# Patient Record
Sex: MALE | Race: WHITE | Employment: OTHER | URBAN - METROPOLITAN AREA
[De-identification: names, ages, dates, MRNs, and addresses within clinical notes are randomized per-mention and may not be internally consistent; named-entity substitution may affect disease eponyms.]

---

## 2017-07-17 ENCOUNTER — HOSPITAL ENCOUNTER (OUTPATIENT)
Facility: CLINIC | Age: 77
Discharge: HOME | End: 2017-07-17
Attending: OPHTHALMOLOGY | Admitting: OPHTHALMOLOGY
Payer: MEDICARE

## 2017-07-17 ENCOUNTER — ANESTHESIA EVENT (OUTPATIENT)
Dept: SURGERY | Facility: CLINIC | Age: 77
End: 2017-07-17
Payer: MEDICARE

## 2017-07-17 ENCOUNTER — ANESTHESIA (OUTPATIENT)
Dept: SURGERY | Facility: CLINIC | Age: 77
End: 2017-07-17
Payer: MEDICARE

## 2017-07-17 VITALS
SYSTOLIC BLOOD PRESSURE: 112 MMHG | WEIGHT: 218 LBS | RESPIRATION RATE: 18 BRPM | TEMPERATURE: 96.4 F | HEIGHT: 67 IN | OXYGEN SATURATION: 96 % | DIASTOLIC BLOOD PRESSURE: 65 MMHG | HEART RATE: 62 BPM | BODY MASS INDEX: 34.21 KG/M2

## 2017-07-17 DIAGNOSIS — H02.831 DERMATOCHALASIS OF BOTH UPPER EYELIDS: Primary | ICD-10-CM

## 2017-07-17 DIAGNOSIS — H02.834 DERMATOCHALASIS OF BOTH UPPER EYELIDS: Primary | ICD-10-CM

## 2017-07-17 PROCEDURE — 25000125 ZZHC RX 250: Performed by: OPHTHALMOLOGY

## 2017-07-17 PROCEDURE — 36000101 ZZH EYE SURGERY LEVEL 3 1ST 30 MIN: Performed by: OPHTHALMOLOGY

## 2017-07-17 PROCEDURE — 25000125 ZZHC RX 250

## 2017-07-17 PROCEDURE — 25000125 ZZHC RX 250: Performed by: ANESTHESIOLOGY

## 2017-07-17 PROCEDURE — 37000008 ZZH ANESTHESIA TECHNICAL FEE, 1ST 30 MIN: Performed by: OPHTHALMOLOGY

## 2017-07-17 PROCEDURE — 71000028 ZZH EYE RECOVERY PHASE 2 EACH 15 MINS: Performed by: OPHTHALMOLOGY

## 2017-07-17 PROCEDURE — 36000102 ZZH EYE SURGERY LEVEL 3 EA 15 ADDTL MIN: Performed by: OPHTHALMOLOGY

## 2017-07-17 PROCEDURE — 37000009 ZZH ANESTHESIA TECHNICAL FEE, EACH ADDTL 15 MIN: Performed by: OPHTHALMOLOGY

## 2017-07-17 PROCEDURE — 27210794 ZZH OR GENERAL SUPPLY STERILE: Performed by: OPHTHALMOLOGY

## 2017-07-17 PROCEDURE — 25000125 ZZHC RX 250: Performed by: NURSE ANESTHETIST, CERTIFIED REGISTERED

## 2017-07-17 PROCEDURE — 25000128 H RX IP 250 OP 636: Performed by: ANESTHESIOLOGY

## 2017-07-17 PROCEDURE — 40000170 ZZH STATISTIC PRE-PROCEDURE ASSESSMENT II: Performed by: OPHTHALMOLOGY

## 2017-07-17 PROCEDURE — 25000128 H RX IP 250 OP 636: Performed by: NURSE ANESTHETIST, CERTIFIED REGISTERED

## 2017-07-17 RX ORDER — PROPOFOL 10 MG/ML
INJECTION, EMULSION INTRAVENOUS PRN
Status: DISCONTINUED | OUTPATIENT
Start: 2017-07-17 | End: 2017-07-17

## 2017-07-17 RX ORDER — ERYTHROMYCIN 5 MG/G
OINTMENT OPHTHALMIC PRN
Status: DISCONTINUED | OUTPATIENT
Start: 2017-07-17 | End: 2017-07-17 | Stop reason: HOSPADM

## 2017-07-17 RX ORDER — FENTANYL CITRATE 50 UG/ML
INJECTION, SOLUTION INTRAMUSCULAR; INTRAVENOUS PRN
Status: DISCONTINUED | OUTPATIENT
Start: 2017-07-17 | End: 2017-07-17

## 2017-07-17 RX ORDER — HYDROCODONE BITARTRATE AND ACETAMINOPHEN 5; 325 MG/1; MG/1
1 TABLET ORAL EVERY 6 HOURS PRN
Qty: 15 TABLET | Refills: 0 | Status: SHIPPED | OUTPATIENT
Start: 2017-07-17 | End: 2017-09-18

## 2017-07-17 RX ORDER — ONDANSETRON 2 MG/ML
INJECTION INTRAMUSCULAR; INTRAVENOUS PRN
Status: DISCONTINUED | OUTPATIENT
Start: 2017-07-17 | End: 2017-07-17

## 2017-07-17 RX ORDER — TETRACAINE HYDROCHLORIDE 5 MG/ML
SOLUTION OPHTHALMIC PRN
Status: DISCONTINUED | OUTPATIENT
Start: 2017-07-17 | End: 2017-07-17 | Stop reason: HOSPADM

## 2017-07-17 RX ORDER — SODIUM CHLORIDE, SODIUM LACTATE, POTASSIUM CHLORIDE, CALCIUM CHLORIDE 600; 310; 30; 20 MG/100ML; MG/100ML; MG/100ML; MG/100ML
INJECTION, SOLUTION INTRAVENOUS CONTINUOUS
Status: DISCONTINUED | OUTPATIENT
Start: 2017-07-17 | End: 2017-07-17 | Stop reason: HOSPADM

## 2017-07-17 RX ORDER — ERYTHROMYCIN 5 MG/G
OINTMENT OPHTHALMIC
Qty: 1 G | Refills: 1 | Status: SHIPPED | OUTPATIENT
Start: 2017-07-17

## 2017-07-17 RX ADMIN — FENTANYL CITRATE 25 MCG: 50 INJECTION, SOLUTION INTRAMUSCULAR; INTRAVENOUS at 13:56

## 2017-07-17 RX ADMIN — SODIUM CHLORIDE, POTASSIUM CHLORIDE, SODIUM LACTATE AND CALCIUM CHLORIDE: 600; 310; 30; 20 INJECTION, SOLUTION INTRAVENOUS at 12:44

## 2017-07-17 RX ADMIN — DEXMEDETOMIDINE HYDROCHLORIDE 8 MCG: 100 INJECTION, SOLUTION INTRAVENOUS at 13:38

## 2017-07-17 RX ADMIN — PROPOFOL 10 MG: 10 INJECTION, EMULSION INTRAVENOUS at 13:59

## 2017-07-17 RX ADMIN — MIDAZOLAM HYDROCHLORIDE 0.5 MG: 1 INJECTION, SOLUTION INTRAMUSCULAR; INTRAVENOUS at 13:38

## 2017-07-17 RX ADMIN — PROPOFOL 30 MG: 10 INJECTION, EMULSION INTRAVENOUS at 13:56

## 2017-07-17 RX ADMIN — DEXMEDETOMIDINE HYDROCHLORIDE 4 MCG: 100 INJECTION, SOLUTION INTRAVENOUS at 13:52

## 2017-07-17 RX ADMIN — FENTANYL CITRATE 25 MCG: 50 INJECTION, SOLUTION INTRAMUSCULAR; INTRAVENOUS at 13:39

## 2017-07-17 RX ADMIN — DEXMEDETOMIDINE HYDROCHLORIDE 8 MCG: 100 INJECTION, SOLUTION INTRAVENOUS at 13:44

## 2017-07-17 RX ADMIN — LIDOCAINE HYDROCHLORIDE 1 ML: 10 INJECTION, SOLUTION EPIDURAL; INFILTRATION; INTRACAUDAL; PERINEURAL at 12:44

## 2017-07-17 RX ADMIN — ONDANSETRON 4 MG: 2 INJECTION INTRAMUSCULAR; INTRAVENOUS at 13:45

## 2017-07-17 NOTE — DISCHARGE INSTRUCTIONS
POST-OPERATIVE CARE FOLLOWING EYELID SURGERY  DR. Bryon Chandler  [facility] EYE [facility]  [phone]  ICE COMPRESSES:  Immediately following surgery, you should begin to apply ice compresses.  A good method is to use a clean washcloth soaked in a bowl of ice water.  Wring it out and gently lay across your eyes.  Do this at least 6 times per day for 15 minutes, but not while sleeping.  Continue cold compresses for the first four days after surgery, or longer if swelling persists.    OINTMENT:  Apply ERYTHROMYCIN OINTMENT to the surgical area (along the sutures if present) 3 TIMES PER DAY FOR 1 WEEK.  If you get ointment in your eye, it will blur your vision slightly, but will not harm you eye.  If you run out of antibiotic ointment use Vaseline instead until the wounds are fully healed.    ACTIVITY:  You may resume regular activities as tolerated.  You may shower and wash your hair on the day after surgery, be careful to avoid getting shampoo in your eyes. Sleeping with your head elevated will help with swelling.    MEDICATION:  Some discomfort and tenderness may be noticed around the eye.  If the DrLauren has prescribed pain medication, please take it according to the directions on the bottle.  If you are not prescribed pain medication or you feel you do not need it, you may take Extra Strength Tylenol for mild pain.      WHAT TO EXPECT:  You should expect some slight oozing of blood from the incision site over the first two to three days after surgery.  Swelling and bruising will occur for one to two weeks or longer.  You may also experience itching and tearing during the first several weeks after surgery.  This is part of the normal healing process.    CALL THE DOCTOR S OFFICE WITH ANY QUESTIONS OR CONCERNS, [phone].  Please call if you suspect your wound has opened, you have a large / growing bruise (hematoma), vision loss not attributable to the ointment, or bleeding that does not seem to be  stopping.    [facility] Anesthesia [facility] Discharge  Instructions  Anesthesia (Eye Care Center)   Adult Discharge Instructions    For 24 hours after surgery    1. Get plenty of rest.  Make arrangements to have a responsible adult stay with you for at least 6 hours after you leave the hospital.  2. Do not drive or use heavy equipment for 24 hours.    3. Do not drink alcohol for 24 hours.  4. Do not sign legal documents or make important decisions for 24 hours.  5. Avoid strenuous or risky activities. You may feel lightheaded.  If so, sit for a few minutes before standing.  Have someone help you get up.   6. Conscious sedation patients may resume a regular diet..  7. Any questions of medical nature, call your physician.

## 2017-07-17 NOTE — ANESTHESIA CARE TRANSFER NOTE
Patient: Juan Higuera    Procedure(s):  BILATERAL UPPER LID BLEPHAROPLASTY - Wound Class: I-Clean    Diagnosis: PTOSIS  Diagnosis Additional Information: No value filed.    Anesthesia Type:   MAC     Note:  Airway :Room Air  Patient transferred to:PACU  Comments: VSS. Airway and IV patent. Patient comfortable. Report to RN. Stable care transfer.      Vitals: (Last set prior to Anesthesia Care Transfer)    CRNA VITALS  7/17/2017 1416 - 7/17/2017 1448      7/17/2017             Pulse: 62    SpO2: 94 %    Resp Rate (set): 10    EKG: Sinus bradycardia                Electronically Signed By: LESLIE Wood CRNA  July 17, 2017  2:48 PM

## 2017-07-17 NOTE — ANESTHESIA PREPROCEDURE EVALUATION
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)      History of shingles      Hydrocoele     bilat per ankur     Hypercholesterolemia      Hyperlipidaemia      Hypertension      Joint pain      Pituitary adenoma (H)      Plantar fasciitis      Shingles      Sleep apnea     uses cpap     Spinal stenosis      Spinal stenosis      Spinal stenosis      Vitreous hemorrhage (H)      Past Surgical History:   Procedure Laterality Date     APPENDECTOMY       COLONOSCOPY       COLONOSCOPY N/A 10/1/2015    Procedure: COLONOSCOPY;  Surgeon: Walter Arreola MD;  Location:  GI     GENITOURINARY SURGERY      VASECTOMY     GRAFT BONE FROM ILIAC CREST Right 1/4/2016    Procedure: GRAFT BONE FROM ILIAC CREST;  Surgeon: Fly Jauregui MD;  Location:  OR     HYDROCELECTOMY SCROTAL  7/30/2013    Procedure: HYDROCELECTOMY SCROTAL;  BILATERAL HYDROCELECTOMY;  Surgeon: Esdras Cintron MD;  Location:  SD     INCISION LIMBAL RELAXING, KERATOTOMY ARCUATE  9/16/2013    Procedure: INCISION LIMBAL RELAXING, KERATOTOMY ARCUATE;;  Surgeon: Bryon Chandler MD;  Location:  EC     INCISION LIMBAL RELAXING, KERATOTOMY ARCUATE  10/7/2013    Procedure: INCISION LIMBAL RELAXING, KERATOTOMY ARCUATE;;  Surgeon: Bryon Chandler MD;  Location:  EC     LAMINECTOMY, FUSION LUMBAR ONE LEVEL, COMBINED N/A 1/4/2016    Procedure: COMBINED LAMINECTOMY, FUSION LUMBAR ONE LEVEL;  Surgeon: Fly Jauregui MD;  Location:  OR     PHACOEMULSIFICATION CLEAR CORNEA WITH STANDARD INTRAOCULAR LENS IMPLANT  9/16/2013    Procedure: PHACOEMULSIFICATION CLEAR CORNEA WITH STANDARD INTRAOCULAR LENS  IMPLANT;  RIGHT EYE PHACOEMULSIFICATION CLEAR CORNEA WITH STANDARD INTRAOCULAR LENS IMPLANT AND LIMBAL RELAXING INCISION  ;  Surgeon: Bryon Chandler MD;  Location: [facility]     PHACOEMULSIFICATION CLEAR CORNEA WITH STANDARD INTRAOCULAR LENS IMPLANT  10/7/2013    Procedure: PHACOEMULSIFICATION CLEAR CORNEA WITH STANDARD INTRAOCULAR LENS IMPLANT;  LEFT EYE PHACOEMULSIFICATION CLEAR CORNEA WITH STANDARD INTRAOCULAR LENS IMPLANT AND LIMBAL RELAXING INCISION ;  Surgeon: Bryon Chandler MD;  Location: [facility]     Prior to Admission medications    Medication Sig Start Date End Date Taking? Authorizing Provider   VITAMIN D, CHOLECALCIFEROL, PO Take 5,000 Units by mouth daily    Yes Reported, Patient   Ascorbic Acid (VITAMIN C PO)    Yes Reported, Patient   levothyroxine (SYNTHROID, LEVOTHROID) 12.5 MCG TABS Take 25 mcg by mouth every morning (before breakfast)   Yes Reported, Patient   cabergoline (DOSTINEX) 0.5 MG tablet Take 0.25 mg by mouth twice a week   Yes Reported, Patient   labetalol (NORMODYNE) 200 MG tablet Take 1 tablet (200 mg) by mouth 2 times daily 9/22/16  Yes Abhay Busch MD   MINOXIDIL PO Take 5 mg by mouth daily (takes 0.5 x 10mg tablet)   Yes Reported, Patient   diphenhydrAMINE-acetaminophen (TYLENOL PM)  MG tablet Take 2 tablets by mouth At Bedtime   Yes Reported, Patient   amLODIPine (NORVASC) 5 MG tablet Take 1 tablet (5 mg) by mouth daily 9/14/15  Yes Abhay Busch MD   naproxen sodium (ALEVE) 220 MG tablet Take 440 mg by mouth daily    Yes Reported, Patient   lisinopril-hydrochlorothiazide (PRINZIDE,ZESTORETIC) 20-25 MG per tablet Take 1 tablet by mouth daily 1 tabs daily 11/18/14  Yes Camden Kerns MD   Acetaminophen (TYLENOL PO) Take 1,000 mg by mouth 2 times daily Morning and Noon   Yes Reported, Patient   aspirin 81 MG chewable tablet Take 81 mg by mouth daily.   Yes Reported, Patient   UNKNOWN TO PATIENT Apply topically daily as needed (Eczema) Cream: medication name  unknown    Reported, Patient     Current Facility-Administered Medications Ordered in Epic   Medication Dose Route Frequency Last Rate Last Dose     lidocaine 1 % 1 mL  1 mL Other Q1H PRN   1 mL at 07/17/17 1244     lactated ringers infusion   Intravenous Continuous 25 mL/hr at 07/17/17 1244       No current [facility]-ordered outpatient prescriptions on file.     Wt Readings from Last 1 Encounters:   07/17/17 98.9 kg (218 lb)     Temp Readings from Last 1 Encounters:   07/17/17 35.8  C (96.4  F) (Temporal)     BP Readings from Last 6 Encounters:   07/17/17 99/65   09/22/16 132/76   01/08/16 125/80   10/01/15 (!) 141/95   09/09/15 132/80   01/07/15 130/78     Pulse Readings from Last 4 Encounters:   07/17/17 62   09/22/16 76   09/09/15 68   01/07/15 68     Resp Readings from Last 1 Encounters:   07/17/17 18     SpO2 Readings from Last 1 Encounters:   07/17/17 100%     Recent Labs   Lab Test 07/05/16 01/04/16   0632  01/07/15   0822  11/25/14   1207   NA  142   --   140  142   POTASSIUM  4.3  4.0  4.7  4.6   CHLORIDE  104   --   103  103   CO2   --    --   30*  31*   ANIONGAP   --    --   11.7  12.6   GLC  89   --   107*  91   BUN  24   --   15  14   CR  1.26  1.09  1.06  1.03   LAMONT  9.4   --   9.7  9.9     Recent Labs   Lab Test 07/05/16 01/07/16   0730   01/17/12   1200   WBC  7.4   --    --   6.9   HGB  12.8*  10.0*   < >  14.3   PLT  248   --    --   238    < > = values in this interval not displayed.       Anesthesia Evaluation     . Pt has had prior anesthetic.     No history of anesthetic complications          ROS/MED HX    ENT/Pulmonary:     (+)sleep apnea, uses CPAP , . .    Neurologic: Comment: H/o GBS - left foot drop    (+)neuropathy     Cardiovascular: Comment: Stress test 2 years ago wnl    (+) hypertension----. : . . . :. .       METS/Exercise Tolerance:     Hematologic:         Musculoskeletal:         GI/Hepatic:     (+) GERD       Renal/Genitourinary:         Endo:     (+) Obesity, .      Psychiatric:          Infectious Disease:         Malignancy:         Other:                     Physical Exam  Normal systems: dental    Airway   Mallampati: II  TM distance: >3 FB  Neck ROM: full    Dental   (+) caps  Comment: bridge    Cardiovascular   Rhythm and rate: normal      Pulmonary                         Anesthesia Plan      History & Physical Review  History and physical reviewed and following examination; no interval change.    ASA Status:  2 .        Plan for MAC Reason for MAC:  Procedure to face, neck, head or breast         Postoperative Care      Consents  Anesthetic plan, risks, benefits and alternatives discussed with:  Patient or representative..                          .

## 2017-07-17 NOTE — IP AVS SNAPSHOT
[facility]    [address]    [place] [address]    Phone:  [phone]    Fax:  [phone]                                       After Visit Summary   7/17/2017    Juan Higuera    MRN: [id number]           After Visit Summary Signature Page     I have received my discharge instructions, and my questions have been answered. I have discussed any challenges I see with this plan with the nurse or doctor.    ..........................................................................................................................................  Patient/Patient Representative Signature      ..........................................................................................................................................  Patient Representative Print Name and Relationship to Patient    ..................................................               ................................................  Date                                            Time    ..........................................................................................................................................  Reviewed by Signature/Title    ...................................................              ..............................................  Date                                                            Time

## 2017-07-17 NOTE — IP AVS SNAPSHOT
MRN:[id number]                      After Visit Summary   7/17/2017    Juan Higuera    MRN: [id number]           Thank you!     Thank you for choosing [facility] for your care. Our goal is always to provide you with excellent care. Hearing back from our patients is one way we can continue to improve our services. Please take a few minutes to complete the written survey that you may receive in the mail after you visit with us. Thank you!        Patient Information     Date Of Birth          1940        About your hospital stay     You were admitted on:  July 17, 2017 You last received care in the:  [facility]    You were discharged on:  July 17, 2017       Who to Call     For medical emergencies, please call 911.  For non-urgent questions about your medical care, please call your primary care provider or clinic, [phone]  For questions related to your surgery, please call your surgery clinic        Attending Provider     Provider Specialty    Bryon Chandler MD Ophthalmology       Primary Care Provider Office Phone # Fax #    Deondre Rodriguez PA-C [phone]      Your next 10 appointments already scheduled     Sep 18, 2017  9:45 AM CDT   Return Visit with Abhay Busch MD   [facility] PHYSICIANS [facility]    [address] [place] [id number] [phone]              Further instructions from your care team       POST-OPERATIVE CARE FOLLOWING EYELID SURGERY  DR. Bryon Chandler  [facility] EYE [facility]  [phone]  ICE COMPRESSES:  Immediately following surgery, you should begin to apply ice compresses.  A good method is to use a clean washcloth soaked in a bowl of ice water.  Wring it out and gently lay across your eyes.  Do this at least 6 times per day for 15 minutes, but not while sleeping.  Continue cold compresses for the first four days after surgery, or longer if swelling  persists.    OINTMENT:  Apply ERYTHROMYCIN OINTMENT to the surgical area (along the sutures if present) 3 TIMES PER DAY FOR 1 WEEK.  If you get ointment in your eye, it will blur your vision slightly, but will not harm you eye.  If you run out of antibiotic ointment use Vaseline instead until the wounds are fully healed.    ACTIVITY:  You may resume regular activities as tolerated.  You may shower and wash your hair on the day after surgery, be careful to avoid getting shampoo in your eyes. Sleeping with your head elevated will help with swelling.    MEDICATION:  Some discomfort and tenderness may be noticed around the eye.  If the Dr. has prescribed pain medication, please take it according to the directions on the bottle.  If you are not prescribed pain medication or you feel you do not need it, you may take Extra Strength Tylenol for mild pain.      WHAT TO EXPECT:  You should expect some slight oozing of blood from the incision site over the first two to three days after surgery.  Swelling and bruising will occur for one to two weeks or longer.  You may also experience itching and tearing during the first several weeks after surgery.  This is part of the normal healing process.    CALL THE DOCTOR S OFFICE WITH ANY QUESTIONS OR CONCERNS, [phone].  Please call if you suspect your wound has opened, you have a large / growing bruise (hematoma), vision loss not attributable to the ointment, or bleeding that does not seem to be stopping.    [facility] Anesthesia Eye Care Center Discharge  Instructions  Anesthesia (Eye Care Center)   Adult Discharge Instructions    For 24 hours after surgery    1. Get plenty of rest.  Make arrangements to have a responsible adult stay with you for at least 6 hours after you leave the hospital.  2. Do not drive or use heavy equipment for 24 hours.    3. Do not drink alcohol for 24 hours.  4. Do not sign legal documents or make important decisions for 24 hours.  5. Avoid  "strenuous or risky activities. You may feel lightheaded.  If so, sit for a few minutes before standing.  Have someone help you get up.   6. Conscious sedation patients may resume a regular diet..  7. Any questions of medical nature, call your physician.    Pending Results     No orders found from 7/15/2017 to 2017.            Admission Information     Date & Time Provider Department Dept. Phone    2017 [place], Bryon GONCALVES MD [facility] [phone]      Your Vitals Were     Blood Pressure Pulse Temperature Respirations Height Weight    96/65 62 96.4  F (35.8  C) (Temporal) 16 1.708 m (5' 7.25\") 98.9 kg (218 lb)    Pulse Oximetry BMI (Body Mass Index)                98% 33.89 kg/m2          [employer] Information     [employer] lets you send messages to your doctor, view your test results, renew your prescriptions, schedule appointments and more. To sign up, go to [email or web address] . Click on \"Log in\" on the left side of the screen, which will take you to the Welcome page. Then click on \"Sign up Now\" on the right side of the page.     You will be asked to enter the access code listed below, as well as some personal information. Please follow the directions to create your username and password.     Your access code is: [email or web address]  Expires: 10/15/2017  2:59 PM     Your access code will  in 90 days. If you need help or a new code, please call your [place] clinic or [phone].        Care EveryWhere ID     This is your Care EveryWhere ID. This could be used by other organizations to access your [place] medical records  [id number]        Equal Access to Services     ALBINO GOMEZ AH: Hadii ofelia Cristobal, ashlie hogan, lelo kaalmakiran serna, bull dela cruz. So [facility] [phone].    ATENCIÓN: Si habla español, tiene a aviles disposición servicios gratuitos de asistencia lingüística. Llame al [phone].    We comply with applicable federal " civil rights laws and [place] laws. We do not discriminate on the basis of race, color, national origin, age, disability sex, sexual orientation or gender identity.               Review of your medicines      START taking        Dose / Directions    erythromycin ophthalmic ointment   Commonly known as:  ROMYCIN   Used for:  Dermatochalasis of both upper eyelids        Apply to operative eyelid(s), in eye(s) at bedtime   Quantity:  1 g   Refills:  1       HYDROcodone-acetaminophen 5-325 MG per tablet   Commonly known as:  NORCO   Used for:  Dermatochalasis of both upper eyelids        Dose:  1 tablet   Take 1 tablet by mouth every 6 hours as needed for moderate to severe pain   Quantity:  15 tablet   Refills:  0         CONTINUE these medicines which have NOT CHANGED        Dose / Directions    ALEVE 220 MG tablet   Generic drug:  naproxen sodium        Dose:  440 mg   Take 440 mg by mouth daily   Refills:  0       amLODIPine 5 MG tablet   Commonly known as:  NORVASC   Used for:  Unspecified essential hypertension        Dose:  5 mg   Take 1 tablet (5 mg) by mouth daily   Quantity:  90 tablet   Refills:  3       aspirin 81 MG chewable tablet        Dose:  81 mg   Take 81 mg by mouth daily.   Refills:  0       cabergoline 0.5 MG tablet   Commonly known as:  DOSTINEX        Dose:  0.25 mg   Take 0.25 mg by mouth twice a week   Refills:  0       diphenhydrAMINE-acetaminophen  MG tablet   Commonly known as:  TYLENOL PM        Dose:  2 tablet   Take 2 tablets by mouth At Bedtime   Refills:  0       labetalol 200 MG tablet   Commonly known as:  NORMODYNE        Dose:  200 mg   Take 1 tablet (200 mg) by mouth 2 times daily   Quantity:  1 tablet   Refills:  0       levothyroxine 12.5 mcg Tabs half-tab   Commonly known as:  SYNTHROID/LEVOTHROID        Dose:  25 mcg   Take 25 mcg by mouth every morning (before breakfast)   Refills:  0       lisinopril-hydrochlorothiazide 20-25 MG per tablet   Commonly known as:   PRINZIDE/ZESTORETIC   Used for:  Unspecified essential hypertension        Dose:  1 tablet   Take 1 tablet by mouth daily 1 tabs daily   Quantity:  180 tablet   Refills:  3       MINOXIDIL PO        Dose:  5 mg   Take 5 mg by mouth daily (takes 0.5 x 10mg tablet)   Refills:  0       TYLENOL PO        Dose:  1000 mg   Take 1,000 mg by mouth 2 times daily Morning and Noon   Refills:  0       UNKNOWN TO PATIENT        Apply topically daily as needed (Eczema) Cream: medication name unknown   Refills:  0       VITAMIN C PO        Refills:  0       VITAMIN D (CHOLECALCIFEROL) PO        Dose:  5000 Units   Take 5,000 Units by mouth daily   Refills:  0            Where to get your medicines      These medications were sent to [facility] - [address]  [address], [place] [address]     Phone:  [phone]     erythromycin ophthalmic ointment         Some of these will need a paper prescription and others can be bought over the counter. Ask your nurse if you have questions.     Bring a paper prescription for each of these medications     HYDROcodone-acetaminophen 5-325 MG per tablet                Protect others around you: Learn how to safely use, store and throw away your medicines at www.disposemymeds.org.             Medication List: This is a list of all your medications and when to take them. Check marks below indicate your daily home schedule. Keep this list as a reference.      Medications           Morning Afternoon Evening Bedtime As Needed    ALEVE 220 MG tablet   Take 440 mg by mouth daily   Generic drug:  naproxen sodium                                amLODIPine 5 MG tablet   Commonly known as:  NORVASC   Take 1 tablet (5 mg) by mouth daily                                aspirin 81 MG chewable tablet   Take 81 mg by mouth daily.                                cabergoline 0.5 MG tablet   Commonly known as:  DOSTINEX   Take 0.25 mg by mouth twice a week                                 diphenhydrAMINE-acetaminophen  MG tablet   Commonly known as:  TYLENOL PM   Take 2 tablets by mouth At Bedtime                                erythromycin ophthalmic ointment   Commonly known as:  ROMYCIN   Apply to operative eyelid(s), in eye(s) at bedtime   Last time this was given:  1 g on 7/17/2017  2:14 PM                                HYDROcodone-acetaminophen 5-325 MG per tablet   Commonly known as:  NORCO   Take 1 tablet by mouth every 6 hours as needed for moderate to severe pain                                labetalol 200 MG tablet   Commonly known as:  NORMODYNE   Take 1 tablet (200 mg) by mouth 2 times daily                                levothyroxine 12.5 mcg Tabs half-tab   Commonly known as:  SYNTHROID/LEVOTHROID   Take 25 mcg by mouth every morning (before breakfast)                                lisinopril-hydrochlorothiazide 20-25 MG per tablet   Commonly known as:  PRINZIDE/ZESTORETIC   Take 1 tablet by mouth daily 1 tabs daily                                MINOXIDIL PO   Take 5 mg by mouth daily (takes 0.5 x 10mg tablet)                                TYLENOL PO   Take 1,000 mg by mouth 2 times daily Morning and Noon                                UNKNOWN TO PATIENT   Apply topically daily as needed (Eczema) Cream: medication name unknown                                VITAMIN C PO                                VITAMIN D (CHOLECALCIFEROL) PO   Take 5,000 Units by mouth daily

## 2017-07-17 NOTE — OR NURSING
Pt is ready to leave we are awaiting medications from pharmacy.  Dr. Chandler put the prescription in at the end of the case.

## 2017-07-17 NOTE — ANESTHESIA POSTPROCEDURE EVALUATION
Patient: Juan Higuera    Procedure(s):  BILATERAL UPPER LID BLEPHAROPLASTY - Wound Class: I-Clean    Diagnosis:PTOSIS  Diagnosis Additional Information: No value filed.    Anesthesia Type:  MAC    Note:  Anesthesia Post Evaluation    Patient location during evaluation: PACU  Patient participation: Able to fully participate in evaluation  Level of consciousness: awake  Pain management: adequate  Airway patency: patent  Cardiovascular status: acceptable  Respiratory status: acceptable  Hydration status: acceptable  PONV: none     Anesthetic complications: None          Last vitals:  Vitals:    07/17/17 1245 07/17/17 1446 07/17/17 1500   BP: 99/65 96/65 112/65   Pulse: 62     Resp: 18 16 18   Temp: 35.8  C (96.4  F)     SpO2: 100% 98% 96%         Electronically Signed By: Rachel Leary  July 17, 2017  3:34 PM

## 2017-07-18 ENCOUNTER — TELEPHONE (OUTPATIENT)
Dept: CARDIOLOGY | Facility: CLINIC | Age: 77
End: 2017-07-18

## 2017-07-18 NOTE — TELEPHONE ENCOUNTER
Pt called stating that he was told to call the clinic if his BP went below 100 and his BP was 99/65. Pt stated this was the first time his BP has been below 100 since the med changes last year. BPs have been ranging from 134/80 to 113/68. Tried to call patient back. No answer. Writer left  informing patient that BP of 99/65 is ok as long as he does not have any symptoms associated with that. Writer left team 4 direct number for patient to call back if he is feeling any dizziness, lightheaded or his BP continues to go lower. Pt scheduled for OV 9/18/17 with Dr. Busch     9/22/16 OV with Dr. Busch   ASSESSMENT:   1.  Juan Higuera is a delightful 75-year-old male with a history of resistant hypertension.  His blood pressure is much better controlled.  He does not have coarctation of the aorta, nor does he have evidence of renal artery stenosis.  He remains on a reasonably low-salt diet.  His blood pressure is currently well controlled.  We will increase his labetalol back to 200 mg twice a day.  A followup BMP should again be performed next year through his primary care office.

## 2017-07-20 NOTE — OP NOTE
PREOPERATIVE DIAGNOSIS: Bilateral upper lid dermatochalasis, visually significant. Excess eyelid skin causing visual field loss.  POSTOPERATIVE DIAGNOSIS: Same  PROCEDURE PERFORMED: Bilateral upper lid blepharoplasty.   INDICATIONS FOR PROCEDURE:  Juan Higuera presented to the eye clinic with decreased vision secondary to drooping of her upper eyelid skin which was visually significant based on visual field testing demonstrating improved superior peripheral vision with taped elevation of the eyelids. The risks, benefits and alternatives to bilateral upper lid blepharoplasty were discussed with the patient. All pateint questions were answered. They understood and elected to proceed.   DESCRIPTION OF PROCEDURE: The patient was brought to the operating suite where they were prepped and draped in sterile fashion. Local anesthetic compromised of a 50:50 mixture of 0.5% Marcaine and 2% lidocaine with epinephrine was injected into the subcutaneous tissue. Incisions were marked approximately 9 mm superior to the lash line, and 13 mm inferior to the inferior brow hairs. A pinch technique was used to ensure that sufficient anterior lamellar tissue would be present at the completion of the procedure. Incisions were created using the feather blade. The skin muscle flap was undermined using the Martinez tenotomy scissors. This flap was then excised using a combination of the Moustapha scissors and hot wire cautery. All bleeders were cauterized. The incisions were then closed using a 6-0 fast-absorbing gut suture in a running fashion.  Erythromycin ointment was applied to the wounds  ESTIMATED BLOOD LOSS: 1 cc   COMPLICATIONS: None.   FINDINGS:  Dermatochalasis.  Excess upper-lid skin.   DISPOSITION: The patient will be discharged to home, and will follow up in 1 week in the eye clinic.  Cc Bryon Chandler MD - [facility]

## 2017-09-18 ENCOUNTER — OFFICE VISIT (OUTPATIENT)
Dept: CARDIOLOGY | Facility: CLINIC | Age: 77
End: 2017-09-18
Attending: INTERNAL MEDICINE
Payer: COMMERCIAL

## 2017-09-18 VITALS
DIASTOLIC BLOOD PRESSURE: 72 MMHG | WEIGHT: 209 LBS | BODY MASS INDEX: 31.67 KG/M2 | SYSTOLIC BLOOD PRESSURE: 128 MMHG | HEART RATE: 60 BPM | HEIGHT: 68 IN

## 2017-09-18 DIAGNOSIS — I10 ESSENTIAL HYPERTENSION WITH GOAL BLOOD PRESSURE LESS THAN 140/90: ICD-10-CM

## 2017-09-18 PROCEDURE — 99214 OFFICE O/P EST MOD 30 MIN: CPT | Performed by: INTERNAL MEDICINE

## 2017-09-18 NOTE — MR AVS SNAPSHOT
"              After Visit Summary   2017    Juan Higuera    MRN: [id number]           Patient Information     Date Of Birth          1940        Visit Information        Provider Department      2017 9:45 AM Abhay Busch MD [facility]        Today's Diagnoses     Essential hypertension with goal blood pressure less than 140/90           Follow-ups after your visit        Who to contact     If you have questions or need follow up information about today's clinic visit or your schedule please contact [facility] directly at [phone].  Normal or non-critical lab and imaging results will be communicated to you by iTaggithart, letter or phone within 4 business days after the clinic has received the results. If you do not hear from us within 7 days, please contact the clinic through iTaggithart or phone. If you have a critical or abnormal lab result, we will notify you by phone as soon as possible.  Submit refill requests through [employer] or call your pharmacy and they will forward the refill request to us. Please allow 3 business days for your refill to be completed.          Additional Information About Your Visit        MyChart Information     [employer] lets you send messages to your doctor, view your test results, renew your prescriptions, schedule appointments and more. To sign up, go to [email or web address] . Click on \"Log in\" on the left side of the screen, which will take you to the Welcome page. Then click on \"Sign up Now\" on the right side of the page.     You will be asked to enter the access code listed below, as well as some personal information. Please follow the directions to create your username and password.     Your access code is: [email or web address]  Expires: 10/15/2017  2:59 PM     Your access code will  in 90 days. If you need help or a new code, please call your [facility] clinic or " "[phone].        Care EveryWhere ID     This is your Care EveryWhere ID. This could be used by other organizations to access your [facility] medical records  [id number]        Your Vitals Were     Pulse Height BMI (Body Mass Index)             60 1.727 m (5' 8\") 31.78 kg/m2          Blood Pressure from Last 3 Encounters:   09/18/17 130/78   07/17/17 112/65   09/22/16 132/76    Weight from Last 3 Encounters:   09/18/17 94.8 kg (209 lb)   07/17/17 98.9 kg (218 lb)   09/22/16 100.6 kg (221 lb 11.2 oz)              We Performed the Following     Follow-Up with Cardiologist        Primary Care Provider Office Phone # Fax #    Deondre Rodriguez PA-C [phone]       [place] [facility] [address]  [facility] [address]        Equal Access to Services     ALFONSO GOMEZ : Hadii aad ku hadasho Soomaali, waaxda luqadaha, qaybta kaalmada adeegyada, waxay idiin haybrianan anuradha villanueva . So [facility] [phone].    ATENCIÓN: Si mariaelenala toshia, tiene a aviles disposición servicios gratuitos de asistencia lingüística. Llame al [phone].    We comply with applicable federal civil rights laws and [place] laws. We do not discriminate on the basis of race, color, national origin, age, disability sex, sexual orientation or gender identity.            Thank you!     Thank you for choosing [facility] [place]  for your care. Our goal is always to provide you with excellent care. Hearing back from our patients is one way we can continue to improve our services. Please take a few minutes to complete the written survey that you may receive in the mail after your visit with us. Thank you!             Your Updated Medication List - Protect others around you: Learn how to safely use, store and throw away your medicines at www.disposemymeds.org.          This list is accurate as of: 9/18/17 10:08 AM.  Always use your most recent med list.                   Brand Name Dispense " Instructions for use Diagnosis    ALEVE 220 MG tablet   Generic drug:  naproxen sodium      Take 440 mg by mouth daily        amLODIPine 5 MG tablet    NORVASC    90 tablet    Take 1 tablet (5 mg) by mouth daily    Unspecified essential hypertension       aspirin 81 MG chewable tablet      Take 81 mg by mouth daily.        cabergoline 0.5 MG tablet    DOSTINEX     Take 0.25 mg by mouth twice a week        erythromycin ophthalmic ointment    ROMYCIN    1 g    Apply to operative eyelid(s), in eye(s) at bedtime    Dermatochalasis of both upper eyelids       labetalol 200 MG tablet    NORMODYNE    1 tablet    Take 1 tablet (200 mg) by mouth 2 times daily        levothyroxine 12.5 mcg Tabs half-tab    SYNTHROID/LEVOTHROID     Take 25 mcg by mouth every morning (before breakfast)        lisinopril-hydrochlorothiazide 20-25 MG per tablet    PRINZIDE/ZESTORETIC    180 tablet    Take 1 tablet by mouth daily 1 tabs daily    Unspecified essential hypertension       MINOXIDIL PO      Take 5 mg by mouth daily (takes 0.5 x 10mg tablet)        TYLENOL PO      Take 1,000 mg by mouth 2 times daily Morning and Noon        UNKNOWN TO PATIENT      Apply topically daily as needed (Eczema) Cream: medication name unknown        VITAMIN C PO      Take 1,000 mg by mouth daily        VITAMIN D (CHOLECALCIFEROL) PO      Take 5,000 Units by mouth daily

## 2017-09-18 NOTE — LETTER
"9/18/2017    Deondre Rodriguez PA-C  [facility] [facility]   [address]  [place] [address]    RE: Juan Higuera       Dear Colleague,    I had the pleasure of seeing Juan Higuera in the [facility].    I had the pleasure of seeing your patient, Juan Higuera, at [facility] for evaluation of difficult to control essential hypertension.  The patient has had resistant hypertension for many years and developed peripheral edema in the past while wintering in [place].  He was diagnosed with obstructive sleep apnea and uses CPAP appropriately.  He had increased edema with the use of verapamil and minoxidil and minoxidil dose is lower with the exchange of verapamil to amlodipine.  Spironolactone was added after consultation with the Hypertension Clinic.  The last nuclear stress test in 2013 was normal with inferior wall artifact.  An EKG performed on 12/28/2015 was relatively normal.  There was mild late transition of the R wave at V4, probably consistent with clockwise rotation of the heart.  The patient has never had a known myocardial infarction.  Previous MRI scan of the abdomen showed normal renal arteries and no abdominal aortic aneurysm.  The patient is not exercising to any major extent but does remain active.  He denies angina or shortness of breath.  He denies palpitations, syncope or presyncope.  He has lost some weight by decreasing portion sizes and avoiding that \"second helping.\"      His most recent BMP from 07/2017 includes sodium 147, potassium 5.0, chloride 107, BUN 25, creatinine is 1.33.      PHYSICAL EXAMINATION:  Initial blood pressure was 130/78.  On the left arm 128/72.  Pulse is 60 and regular.  The remainder of the physical examination is as listed below.      Outpatient Encounter Prescriptions as of 9/18/2017   Medication Sig Dispense Refill     VITAMIN D, CHOLECALCIFEROL, PO Take 5,000 Units by mouth daily        " Ascorbic Acid (VITAMIN C PO) Take 1,000 mg by mouth daily        levothyroxine (SYNTHROID, LEVOTHROID) 12.5 MCG TABS Take 25 mcg by mouth every morning (before breakfast)       cabergoline (DOSTINEX) 0.5 MG tablet Take 0.25 mg by mouth twice a week       labetalol (NORMODYNE) 200 MG tablet Take 1 tablet (200 mg) by mouth 2 times daily 1 tablet 0     MINOXIDIL PO Take 5 mg by mouth daily (takes 0.5 x 10mg tablet)       amLODIPine (NORVASC) 5 MG tablet Take 1 tablet (5 mg) by mouth daily 90 tablet 3     naproxen sodium (ALEVE) 220 MG tablet Take 440 mg by mouth daily        lisinopril-hydrochlorothiazide (PRINZIDE,ZESTORETIC) 20-25 MG per tablet Take 1 tablet by mouth daily 1 tabs daily 180 tablet 3     Acetaminophen (TYLENOL PO) Take 1,000 mg by mouth 2 times daily Morning and Noon       aspirin 81 MG chewable tablet Take 81 mg by mouth daily.       erythromycin (ROMYCIN) ophthalmic ointment Apply to operative eyelid(s), in eye(s) at bedtime 1 g 1     [DISCONTINUED] HYDROcodone-acetaminophen (NORCO) 5-325 MG per tablet Take 1 tablet by mouth every 6 hours as needed for moderate to severe pain 15 tablet 0     UNKNOWN TO PATIENT Apply topically daily as needed (Eczema) Cream: medication name unknown       No facility-administered encounter medications on file as of 9/18/2017.      ASSESSMENT:   1.  Juan Higuera is a delightful 76-year-old male with a history of resistant hypertension.  His blood pressure is currently under much better control.  He does not have coarctation of aorta, nor does he have evidence of renal artery stenosis.  He notes that he does eat some salt but tries to reduce the amount.  His blood pressure is now very well controlled on his current medications.  The patient has evidence of mild dehydration with elevated sodium and creatinine.  These are borderline and I have asked the patient to try to drink some more fluids.  I have made no medication changes.  The patient's blood pressure and  medications are stable enough where I will ask him to return to my office on a p.r.n. basis.  Instead, he will follow up with BERRY Ivory.  I am happy to see this patient at any time.   2.  The patient has a pituitary tumor with high prolactin levels.  He continues to take cabergoline twice a week and feels somewhat fatigued.  Evaluation with Endocrinology is ongoing.   3.  Hyperlipidemia.  The patient has been intolerant of statins in the past due to fatigue and myalgias.  He is refusing a statin and his lipids should be rechecked and in Deondre Rodriguez's office.      It is my pleasure to assist in the care of Mr. Juan Higuera.  I will see him again on a p.r.n. basis.  All his questions were answered to his satisfaction.  It has been my pleasure to assist in the care of Juan Higuera.      Sincerely,    Abhay Busch MD     [facility]

## 2017-09-18 NOTE — PROGRESS NOTES
"HISTORY OF PRESENT ILLNESS:  I had the pleasure of seeing your patient, Juan Higuera, at [facility] for evaluation of difficult to control essential hypertension.  The patient has had resistant hypertension for many years and developed peripheral edema in the past while wintering in [place].  He was diagnosed with obstructive sleep apnea and uses CPAP appropriately.  He had increased edema with the use of verapamil and minoxidil and minoxidil dose is lower with the exchange of verapamil to amlodipine.  Spironolactone was added after consultation with the Hypertension Clinic.  The last nuclear stress test in 2013 was normal with inferior wall artifact.  An EKG performed on 12/28/2015 was relatively normal.  There was mild late transition of the R wave at V4, probably consistent with clockwise rotation of the heart.  The patient has never had a known myocardial infarction.  Previous MRI scan of the abdomen showed normal renal arteries and no abdominal aortic aneurysm.  The patient is not exercising to any major extent but does remain active.  He denies angina or shortness of breath.  He denies palpitations, syncope or presyncope.  He has lost some weight by decreasing portion sizes and avoiding that \"second helping.\"      His most recent BMP from 07/2017 includes sodium 147, potassium 5.0, chloride 107, BUN 25, creatinine is 1.33.      PHYSICAL EXAMINATION:  Initial blood pressure was 130/78.  On the left arm 128/72.  Pulse is 60 and regular.  The remainder of the physical examination is as listed below.      ASSESSMENT:   1.  Juan Higuera is a delightful 76-year-old male with a history of resistant hypertension.  His blood pressure is currently under much better control.  He does not have coarctation of aorta, nor does he have evidence of renal artery stenosis.  He notes that he does eat some salt but tries to reduce the amount.  His blood pressure is now very well controlled on his " current medications.  The patient has evidence of mild dehydration with elevated sodium and creatinine.  These are borderline and I have asked the patient to try to drink some more fluids.  I have made no medication changes.  The patient's blood pressure and medications are stable enough where I will ask him to return to my office on a p.r.n. basis.  Instead, he will follow up with BERRY Ivory.  I am happy to see this patient at any time.   2.  The patient has a pituitary tumor with high prolactin levels.  He continues to take cabergoline twice a week and feels somewhat fatigued.  Evaluation with Endocrinology is ongoing.   3.  Hyperlipidemia.  The patient has been intolerant of statins in the past due to fatigue and myalgias.  He is refusing a statin and his lipids should be rechecked and in Deondre Rodriguez's office.      It is my pleasure to assist in the care of Mr. Philip Mccartney.  I will see him again on a p.r.n. basis.  All his questions were answered to his satisfaction.  It has been my pleasure to assist in the care of [place] Mccartney.      cc:   BERRY Ivory   [facility] [place] [address]         EMILY FERGUSON MD, [facility]             D: 2017 10:20   T: 2017 13:56   MT: [email or web address]      Name:     PHILIP MCCARTNEY   MRN:      [id number]        Account:      [id number]   :      1940           Service Date: 2017      Document: [id number]

## 2017-09-18 NOTE — PROGRESS NOTES
HPI and Plan:   See dictation:[id number]    No orders of the defined types were placed in this encounter.      No orders of the defined types were placed in this encounter.      Medications Discontinued During This Encounter   Medication Reason     HYDROcodone-acetaminophen (NORCO) 5-325 MG per tablet Therapy completed         Encounter Diagnosis   Name Primary?     Essential hypertension with goal blood pressure less than 140/90        CURRENT MEDICATIONS:  Current Outpatient Prescriptions   Medication Sig Dispense Refill     VITAMIN D, CHOLECALCIFEROL, PO Take 5,000 Units by mouth daily        Ascorbic Acid (VITAMIN C PO) Take 1,000 mg by mouth daily        levothyroxine (SYNTHROID, LEVOTHROID) 12.5 MCG TABS Take 25 mcg by mouth every morning (before breakfast)       cabergoline (DOSTINEX) 0.5 MG tablet Take 0.25 mg by mouth twice a week       labetalol (NORMODYNE) 200 MG tablet Take 1 tablet (200 mg) by mouth 2 times daily 1 tablet 0     MINOXIDIL PO Take 5 mg by mouth daily (takes 0.5 x 10mg tablet)       amLODIPine (NORVASC) 5 MG tablet Take 1 tablet (5 mg) by mouth daily 90 tablet 3     naproxen sodium (ALEVE) 220 MG tablet Take 440 mg by mouth daily        lisinopril-hydrochlorothiazide (PRINZIDE,ZESTORETIC) 20-25 MG per tablet Take 1 tablet by mouth daily 1 tabs daily 180 tablet 3     Acetaminophen (TYLENOL PO) Take 1,000 mg by mouth 2 times daily Morning and Noon       aspirin 81 MG chewable tablet Take 81 mg by mouth daily.       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)      History of shingles      [place]     bilat per ankur     Hypercholesterolemia      Hyperlipidaemia      Hypertension      Joint pain      Pituitary adenoma (H)      Plantar fasciitis      Shingles      Sleep apnea     uses cpap     Spinal stenosis      Spinal stenosis      Spinal stenosis      Vitreous hemorrhage (H)        PAST SURGICAL HISTORY:  Past Surgical History:   Procedure Laterality Date     APPENDECTOMY       BLEPHAROPLASTY BILATERAL Bilateral 7/17/2017    Procedure: BLEPHAROPLASTY BILATERAL;  BILATERAL UPPER LID BLEPHAROPLASTY;  Surgeon: Bryon Chandler MD;  Location:  EC     COLONOSCOPY       COLONOSCOPY N/A 10/1/2015    Procedure: COLONOSCOPY;  Surgeon: Walter Arreloa MD;  Location:  GI     GENITOURINARY SURGERY      VASECTOMY     GRAFT BONE FROM ILIAC CREST Right 1/4/2016    Procedure: GRAFT BONE FROM ILIAC CREST;  Surgeon: Fly Jauregui MD;  Location:  OR     HYDROCELECTOMY SCROTAL  7/30/2013    Procedure: HYDROCELECTOMY SCROTAL;  BILATERAL HYDROCELECTOMY;  Surgeon: Esdras Cintron MD;  Location:  SD     INCISION LIMBAL RELAXING, KERATOTOMY ARCUATE  9/16/2013    Procedure: INCISION LIMBAL RELAXING, KERATOTOMY ARCUATE;;  Surgeon: Bryon Chandler MD;  Location:  EC     INCISION LIMBAL RELAXING, KERATOTOMY ARCUATE  10/7/2013    Procedure: INCISION LIMBAL RELAXING, KERATOTOMY ARCUATE;;  Surgeon: Bryon Chandler MD;  Location:  EC     LAMINECTOMY, FUSION LUMBAR ONE LEVEL, COMBINED N/A 1/4/2016    Procedure: COMBINED LAMINECTOMY, FUSION LUMBAR ONE LEVEL;  Surgeon: Fly Jauregui MD;  Location:  OR     PHACOEMULSIFICATION CLEAR CORNEA WITH STANDARD INTRAOCULAR LENS IMPLANT  9/16/2013    Procedure: PHACOEMULSIFICATION CLEAR CORNEA WITH STANDARD INTRAOCULAR LENS IMPLANT;  RIGHT EYE PHACOEMULSIFICATION CLEAR CORNEA WITH STANDARD INTRAOCULAR LENS IMPLANT  "AND LIMBAL RELAXING INCISION  ;  Surgeon: Bryon Chandler MD;  Location: [facility]     PHACOEMULSIFICATION CLEAR CORNEA WITH STANDARD INTRAOCULAR LENS IMPLANT  10/7/2013    Procedure: PHACOEMULSIFICATION CLEAR CORNEA WITH STANDARD INTRAOCULAR LENS IMPLANT;  LEFT EYE PHACOEMULSIFICATION CLEAR CORNEA WITH STANDARD INTRAOCULAR LENS IMPLANT AND LIMBAL RELAXING INCISION ;  Surgeon: Bryon Chandler MD;  Location: [facility]       FAMILY HISTORY:  Family History   Problem Relation Age of Onset     Hypertension Mother      Hypertension Father      CEREBROVASCULAR DISEASE Father        SOCIAL HISTORY:  Social History     Social History     Marital status:      Spouse name: N/A     Number of children: N/A     Years of education: N/A     Social History Main Topics     Smoking status: Former Smoker     Quit date: 1/1/1982     Smokeless tobacco: Never Used     Alcohol use Yes      Comment: 2 per week     Drug use: No     Sexual activity: Not Asked     Other Topics Concern     Caffeine Concern No     24 oz. coffee a day.     Special Diet Yes     low sugar,low salt     Exercise No     Seat Belt Yes     Social History Narrative       Review of Systems:  Skin:  Negative       Eyes:  Positive for glasses    ENT:  Negative      Respiratory:  Positive for sleep apnea;CPAP     Cardiovascular:    Positive for;edema    Gastroenterology: Negative      Genitourinary:  not assessed      Musculoskeletal:  Positive for joint pain;joint stiffness;back pain    Neurologic:  Negative      Psychiatric:  Negative      Heme/Lymph/Imm:  Positive for allergies;weight loss (shell fish, peanuts)    Endocrine:  Positive for thyroid disorder      Physical Exam:  Vitals: /78  Pulse 60  Ht 1.727 m (5' 8\")  Wt 94.8 kg (209 lb)  BMI 31.78 kg/m2    Constitutional:  cooperative, alert and oriented, well developed, well nourished, in no acute distress        Skin:  warm and dry to the touch, no apparent skin lesions or masses noted        Head:  " "normocephalic, no masses or lesions   \"sagging\" bilat lower eyelids     Eyes:  pupils equal and round, conjunctivae and lids unremarkable, sclera white, no xanthalasma, EOMS intact, no nystagmus        ENT:  no pallor or cyanosis, dentition good        Neck:  carotid pulses are full and equal bilaterally, JVP normal, no carotid bruit, no thyromegaly        Chest:  normal breath sounds, clear to auscultation, normal A-P diameter, normal symmetry, normal respiratory excursion, no use of accessory muscles          Cardiac: regular rhythm, normal S1/S2, no S3 or S4, apical impulse not displaced, no murmurs, gallops or rubs                  Abdomen:  abdomen soft, non-tender, BS normoactive, no mass, no HSM, no bruits        Vascular: pulses full and equal, no bruits auscultated                                        Extremities and Back:  no deformities, clubbing, cyanosis, erythema observed;no edema              Neurological:  affect appropriate, oriented to time, person and place;no gross motor deficits              CC  Abhay Busch MD  [address]  [place] [id number]              "

## 2019-11-04 ENCOUNTER — OFFICE VISIT (OUTPATIENT)
Dept: SURGERY | Facility: CLINIC | Age: 79
End: 2019-11-04
Payer: COMMERCIAL

## 2019-11-04 ENCOUNTER — PREP FOR PROCEDURE (OUTPATIENT)
Dept: SURGERY | Facility: CLINIC | Age: 79
End: 2019-11-04

## 2019-11-04 VITALS
HEART RATE: 70 BPM | BODY MASS INDEX: 31.55 KG/M2 | DIASTOLIC BLOOD PRESSURE: 62 MMHG | SYSTOLIC BLOOD PRESSURE: 138 MMHG | WEIGHT: 201 LBS | HEIGHT: 67 IN

## 2019-11-04 DIAGNOSIS — K40.90 RIGHT INGUINAL HERNIA: Primary | ICD-10-CM

## 2019-11-04 PROCEDURE — 99204 OFFICE O/P NEW MOD 45 MIN: CPT | Performed by: SURGERY

## 2019-11-04 ASSESSMENT — MIFFLIN-ST. JEOR: SCORE: 1580.36

## 2019-11-04 NOTE — PROGRESS NOTES
[facility]  Surgery Consultation    Primary care provider:  Deondre Rodriguez [phone]  Consultation requested by: Bryon Navarro MD    HPI: This is an 78-year-old gentleman referred by the above-mentioned primary care provider for consultation regarding possible right inguinal hernia.  He reports that 6 weeks ago he began to experience discomfort and pain in his right groin at the end of a concert.  He sings regularly in a men's choir.  He has had this recur numerous occasions.  He said no radiation into his testicle.  He said no change in his bowel habits.    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  PSH:    has a past surgical history that includes appendectomy; Abdomen surgery; Hydrocelectomy scrotal (7/30/2013); Phacoemulsification clear cornea with standard intraocular lens implant (9/16/2013); incision limbal relaxing, keratotomy arcuate (9/16/2013); colonoscopy; Phacoemulsification clear cornea with standard intraocular lens implant (10/7/2013); incision limbal relaxing, keratotomy arcuate (10/7/2013); Colonoscopy (N/A, 10/1/2015); Laminectomy, fusion lumbar one level, combined (N/A, 1/4/2016); Graft bone from iliac crest (Right, 1/4/2016); and Blepharoplasty bilateral (Bilateral, 7/17/2017).  Social History:   reports that he quit smoking about 37 years ago. He has never used smokeless tobacco. He reports current alcohol use. He reports that he does not use drugs.  Family History:  family history includes Cerebrovascular Disease in his father; Hypertension in his father and mother.  Medications/Allergies: Home medications and allergies  "reviewed.    ROS:  The 10 point Review of Systems is negative other than noted in the HPI.    Physical Exam:  /62   Pulse 70   Ht 1.702 m (5' 7\")   Wt 91.2 kg (201 lb)   BMI 31.48 kg/m    GENERAL: Generally appears well.  Psych: Alert and Oriented.  Normal affect  Eyes: Sclera clear  Respiratory:  Lungs clear to ausculation bilaterally with good air excursion  Cardiovascular:  Regular Rate and Rhythm with no murmurs gallops or rubs, normal peripheral pulses  GI: Abdomen Non Distended Non-Tender  No hernias palpated..  Groin- I examined the patient in both the standing and supine positions. Right Groin- Small inguinal hernia.  Hernia was easily reduciable. Left Groin- No hernia Palpated. No scrotal or testicle abnormalities.  Lymphatic/Hematologic/Immune:  No femoral or cervical lymphadenopathy.  Integumentary:  No rashes  Neurological: grossly intact     All new lab and imaging data was reviewed.     Impression and Plan:  Patient is a 78 year old male with right inguinal hernia    PLAN: Plan laparoscopic repair possible bilateral at his convenience  I discussed the pathophysiology of hernias and options for repair including laparoscopic VS open.  The risks associated with the procedure including, but not limited to, recurrence, nerve entrapment or injury, persistence of pain, injury to the bowel/bladder, infertility, hematoma, mesh migration, mesh infection, MI, and PE were discussed with the patient. He indicated understanding of the discussion, asked appropriate questions, and provided consent. Signs and symptoms of incarceration were discussed. If these develop in the interim, he promises to call or go straight to the ER. I have provided the patient with an information pamphlet.  Thank you very much for this consult.    Hugh Bentley M.D.  [facility]  [phone]    Please route or send letter to:  Primary Care Provider (PCP) and Referring Provider  "

## 2019-11-05 ENCOUNTER — TELEPHONE (OUTPATIENT)
Dept: SURGERY | Facility: CLINIC | Age: 79
End: 2019-11-05

## 2019-11-05 PROBLEM — K40.90 RIGHT INGUINAL HERNIA: Status: ACTIVE | Noted: 2019-11-05

## 2019-11-05 NOTE — TELEPHONE ENCOUNTER
Type of surgery: Laparoscopic right inguinal hernia repair, possible bilateral  Location of surgery: [facility]  Date and time of surgery: 11/13/19 at 8:30am  Surgeon: Dr. Hugh Bentley  Pre-Op Appt Date: Patient to schedule  Post-Op Appt Date: Patient to schedule   Packet sent out: Yes  Pre-cert/Authorization completed:  Not Applicable  Date: 11/5/19

## 2019-11-12 ENCOUNTER — ANESTHESIA EVENT (OUTPATIENT)
Dept: SURGERY | Facility: CLINIC | Age: 79
End: 2019-11-12
Payer: COMMERCIAL

## 2019-11-12 RX ORDER — PANTOPRAZOLE SODIUM 40 MG/1
40 TABLET, DELAYED RELEASE ORAL DAILY
COMMUNITY

## 2019-11-12 RX ORDER — SPIRONOLACTONE 25 MG/1
25 TABLET ORAL DAILY
COMMUNITY

## 2019-11-13 ENCOUNTER — HOSPITAL ENCOUNTER (OUTPATIENT)
Facility: CLINIC | Age: 79
Discharge: HOME | End: 2019-11-13
Attending: SURGERY | Admitting: SURGERY
Payer: COMMERCIAL

## 2019-11-13 ENCOUNTER — ANESTHESIA (OUTPATIENT)
Dept: SURGERY | Facility: CLINIC | Age: 79
End: 2019-11-13
Payer: COMMERCIAL

## 2019-11-13 ENCOUNTER — APPOINTMENT (OUTPATIENT)
Dept: SURGERY | Facility: PHYSICIAN GROUP | Age: 79
End: 2019-11-13
Payer: COMMERCIAL

## 2019-11-13 VITALS
WEIGHT: 206.4 LBS | OXYGEN SATURATION: 96 % | BODY MASS INDEX: 32.33 KG/M2 | SYSTOLIC BLOOD PRESSURE: 174 MMHG | DIASTOLIC BLOOD PRESSURE: 92 MMHG | HEART RATE: 64 BPM | RESPIRATION RATE: 16 BRPM | TEMPERATURE: 97.5 F

## 2019-11-13 DIAGNOSIS — K40.90 RIGHT INGUINAL HERNIA: Primary | ICD-10-CM

## 2019-11-13 DIAGNOSIS — K40.90 RIGHT INGUINAL HERNIA: ICD-10-CM

## 2019-11-13 LAB — POTASSIUM SERPL-SCNC: 3.9 MMOL/L (ref 3.4–5.3)

## 2019-11-13 PROCEDURE — 37000009 ZZH ANESTHESIA TECHNICAL FEE, EACH ADDTL 15 MIN: Performed by: SURGERY

## 2019-11-13 PROCEDURE — 49650 LAP ING HERNIA REPAIR INIT: CPT | Mod: 50 | Performed by: SURGERY

## 2019-11-13 PROCEDURE — 36000056 ZZH SURGERY LEVEL 3 1ST 30 MIN: Performed by: SURGERY

## 2019-11-13 PROCEDURE — 71000027 ZZH RECOVERY PHASE 2 EACH 15 MINS: Performed by: SURGERY

## 2019-11-13 PROCEDURE — 25000125 ZZHC RX 250: Performed by: ANESTHESIOLOGY

## 2019-11-13 PROCEDURE — 25000125 ZZHC RX 250

## 2019-11-13 PROCEDURE — 25000566 ZZH SEVOFLURANE, EA 15 MIN: Performed by: SURGERY

## 2019-11-13 PROCEDURE — 27210794 ZZH OR GENERAL SUPPLY STERILE: Performed by: SURGERY

## 2019-11-13 PROCEDURE — 25000128 H RX IP 250 OP 636: Performed by: NURSE ANESTHETIST, CERTIFIED REGISTERED

## 2019-11-13 PROCEDURE — 25000128 H RX IP 250 OP 636

## 2019-11-13 PROCEDURE — 25000125 ZZHC RX 250: Performed by: SURGERY

## 2019-11-13 PROCEDURE — C1781 MESH (IMPLANTABLE): HCPCS | Performed by: SURGERY

## 2019-11-13 PROCEDURE — 36415 COLL VENOUS BLD VENIPUNCTURE: CPT | Performed by: ANESTHESIOLOGY

## 2019-11-13 PROCEDURE — 84132 ASSAY OF SERUM POTASSIUM: CPT | Performed by: ANESTHESIOLOGY

## 2019-11-13 PROCEDURE — 25000132 ZZH RX MED GY IP 250 OP 250 PS 637: Performed by: STUDENT IN AN ORGANIZED HEALTH CARE EDUCATION/TRAINING PROGRAM

## 2019-11-13 PROCEDURE — 25000125 ZZHC RX 250: Performed by: NURSE ANESTHETIST, CERTIFIED REGISTERED

## 2019-11-13 PROCEDURE — 25000128 H RX IP 250 OP 636: Performed by: SURGERY

## 2019-11-13 PROCEDURE — 37000008 ZZH ANESTHESIA TECHNICAL FEE, 1ST 30 MIN: Performed by: SURGERY

## 2019-11-13 PROCEDURE — 71000012 ZZH RECOVERY PHASE 1 LEVEL 1 FIRST HR: Performed by: SURGERY

## 2019-11-13 PROCEDURE — 71000013 ZZH RECOVERY PHASE 1 LEVEL 1 EA ADDTL HR: Performed by: SURGERY

## 2019-11-13 PROCEDURE — 25800030 ZZH RX IP 258 OP 636: Performed by: ANESTHESIOLOGY

## 2019-11-13 PROCEDURE — 36000058 ZZH SURGERY LEVEL 3 EA 15 ADDTL MIN: Performed by: SURGERY

## 2019-11-13 PROCEDURE — 49650 LAP ING HERNIA REPAIR INIT: CPT | Mod: AS | Performed by: PHYSICIAN ASSISTANT

## 2019-11-13 PROCEDURE — 40000170 ZZH STATISTIC PRE-PROCEDURE ASSESSMENT II: Performed by: SURGERY

## 2019-11-13 DEVICE — MESH PROGRIP LAPAROSCOPIC 5.9X3.9" PARIETEX SELF-FIX LPG1510: Type: IMPLANTABLE DEVICE | Site: INGUINAL | Status: FUNCTIONAL

## 2019-11-13 RX ORDER — SODIUM CHLORIDE, SODIUM LACTATE, POTASSIUM CHLORIDE, CALCIUM CHLORIDE 600; 310; 30; 20 MG/100ML; MG/100ML; MG/100ML; MG/100ML
INJECTION, SOLUTION INTRAVENOUS CONTINUOUS
Status: DISCONTINUED | OUTPATIENT
Start: 2019-11-13 | End: 2019-11-13 | Stop reason: HOSPADM

## 2019-11-13 RX ORDER — ACETAMINOPHEN 325 MG/1
650 TABLET ORAL
Status: DISCONTINUED | OUTPATIENT
Start: 2019-11-13 | End: 2019-11-13 | Stop reason: HOSPADM

## 2019-11-13 RX ORDER — DEXAMETHASONE SODIUM PHOSPHATE 4 MG/ML
INJECTION, SOLUTION INTRA-ARTICULAR; INTRALESIONAL; INTRAMUSCULAR; INTRAVENOUS; SOFT TISSUE PRN
Status: DISCONTINUED | OUTPATIENT
Start: 2019-11-13 | End: 2019-11-13

## 2019-11-13 RX ORDER — PROPOFOL 10 MG/ML
INJECTION, EMULSION INTRAVENOUS PRN
Status: DISCONTINUED | OUTPATIENT
Start: 2019-11-13 | End: 2019-11-13

## 2019-11-13 RX ORDER — ACETAMINOPHEN 650 MG/1
650 SUPPOSITORY RECTAL EVERY 4 HOURS PRN
Status: DISCONTINUED | OUTPATIENT
Start: 2019-11-13 | End: 2019-11-13 | Stop reason: HOSPADM

## 2019-11-13 RX ORDER — NALOXONE HYDROCHLORIDE 0.4 MG/ML
.1-.4 INJECTION, SOLUTION INTRAMUSCULAR; INTRAVENOUS; SUBCUTANEOUS
Status: DISCONTINUED | OUTPATIENT
Start: 2019-11-13 | End: 2019-11-13 | Stop reason: HOSPADM

## 2019-11-13 RX ORDER — OXYCODONE HYDROCHLORIDE 5 MG/1
5 TABLET ORAL EVERY 4 HOURS PRN
Qty: 10 TABLET | Refills: 0 | Status: SHIPPED | OUTPATIENT
Start: 2019-11-13 | End: 2023-06-05

## 2019-11-13 RX ORDER — HYDROMORPHONE HYDROCHLORIDE 1 MG/ML
.3-.5 INJECTION, SOLUTION INTRAMUSCULAR; INTRAVENOUS; SUBCUTANEOUS EVERY 10 MIN PRN
Status: DISCONTINUED | OUTPATIENT
Start: 2019-11-13 | End: 2019-11-13 | Stop reason: HOSPADM

## 2019-11-13 RX ORDER — ONDANSETRON 2 MG/ML
4 INJECTION INTRAMUSCULAR; INTRAVENOUS EVERY 30 MIN PRN
Status: DISCONTINUED | OUTPATIENT
Start: 2019-11-13 | End: 2019-11-13 | Stop reason: HOSPADM

## 2019-11-13 RX ORDER — ONDANSETRON 2 MG/ML
INJECTION INTRAMUSCULAR; INTRAVENOUS PRN
Status: DISCONTINUED | OUTPATIENT
Start: 2019-11-13 | End: 2019-11-13

## 2019-11-13 RX ORDER — ONDANSETRON 4 MG/1
4 TABLET, ORALLY DISINTEGRATING ORAL EVERY 30 MIN PRN
Status: DISCONTINUED | OUTPATIENT
Start: 2019-11-13 | End: 2019-11-13 | Stop reason: HOSPADM

## 2019-11-13 RX ORDER — MAGNESIUM HYDROXIDE 1200 MG/15ML
LIQUID ORAL PRN
Status: DISCONTINUED | OUTPATIENT
Start: 2019-11-13 | End: 2019-11-13 | Stop reason: HOSPADM

## 2019-11-13 RX ORDER — FENTANYL CITRATE 0.05 MG/ML
25-50 INJECTION, SOLUTION INTRAMUSCULAR; INTRAVENOUS
Status: DISCONTINUED | OUTPATIENT
Start: 2019-11-13 | End: 2019-11-13 | Stop reason: HOSPADM

## 2019-11-13 RX ORDER — CEFAZOLIN SODIUM 2 G/100ML
2 INJECTION, SOLUTION INTRAVENOUS
Status: COMPLETED | OUTPATIENT
Start: 2019-11-13 | End: 2019-11-13

## 2019-11-13 RX ORDER — FENTANYL CITRATE 50 UG/ML
INJECTION, SOLUTION INTRAMUSCULAR; INTRAVENOUS PRN
Status: DISCONTINUED | OUTPATIENT
Start: 2019-11-13 | End: 2019-11-13

## 2019-11-13 RX ORDER — LIDOCAINE HYDROCHLORIDE 20 MG/ML
INJECTION, SOLUTION INFILTRATION; PERINEURAL PRN
Status: DISCONTINUED | OUTPATIENT
Start: 2019-11-13 | End: 2019-11-13

## 2019-11-13 RX ORDER — OXYCODONE HYDROCHLORIDE 5 MG/1
5 TABLET ORAL
Status: COMPLETED | OUTPATIENT
Start: 2019-11-13 | End: 2019-11-13

## 2019-11-13 RX ORDER — MEPERIDINE HYDROCHLORIDE 25 MG/ML
12.5 INJECTION INTRAMUSCULAR; INTRAVENOUS; SUBCUTANEOUS
Status: DISCONTINUED | OUTPATIENT
Start: 2019-11-13 | End: 2019-11-13 | Stop reason: HOSPADM

## 2019-11-13 RX ORDER — CEFAZOLIN SODIUM 1 G/3ML
1 INJECTION, POWDER, FOR SOLUTION INTRAMUSCULAR; INTRAVENOUS SEE ADMIN INSTRUCTIONS
Status: DISCONTINUED | OUTPATIENT
Start: 2019-11-13 | End: 2019-11-13 | Stop reason: HOSPADM

## 2019-11-13 RX ORDER — ALBUTEROL SULFATE 0.83 MG/ML
2.5 SOLUTION RESPIRATORY (INHALATION) EVERY 4 HOURS PRN
Status: DISCONTINUED | OUTPATIENT
Start: 2019-11-13 | End: 2019-11-13 | Stop reason: HOSPADM

## 2019-11-13 RX ADMIN — ROCURONIUM BROMIDE 35 MG: 10 INJECTION INTRAVENOUS at 09:03

## 2019-11-13 RX ADMIN — LIDOCAINE HYDROCHLORIDE 60 MG: 20 INJECTION, SOLUTION INFILTRATION; PERINEURAL at 09:03

## 2019-11-13 RX ADMIN — CEFAZOLIN SODIUM 2 G: 2 INJECTION, SOLUTION INTRAVENOUS at 09:12

## 2019-11-13 RX ADMIN — SUGAMMADEX 200 MG: 100 INJECTION, SOLUTION INTRAVENOUS at 10:10

## 2019-11-13 RX ADMIN — PROPOFOL 40 MG: 10 INJECTION, EMULSION INTRAVENOUS at 10:02

## 2019-11-13 RX ADMIN — ROCURONIUM BROMIDE 15 MG: 10 INJECTION INTRAVENOUS at 09:12

## 2019-11-13 RX ADMIN — ONDANSETRON 4 MG: 2 INJECTION INTRAMUSCULAR; INTRAVENOUS at 10:05

## 2019-11-13 RX ADMIN — PROPOFOL 50 MG: 10 INJECTION, EMULSION INTRAVENOUS at 09:11

## 2019-11-13 RX ADMIN — FENTANYL CITRATE 50 MCG: 50 INJECTION, SOLUTION INTRAMUSCULAR; INTRAVENOUS at 09:26

## 2019-11-13 RX ADMIN — LIDOCAINE HYDROCHLORIDE 2 ML: 10 INJECTION, SOLUTION EPIDURAL; INFILTRATION; INTRACAUDAL; PERINEURAL at 07:42

## 2019-11-13 RX ADMIN — FENTANYL CITRATE 50 MCG: 50 INJECTION, SOLUTION INTRAMUSCULAR; INTRAVENOUS at 09:03

## 2019-11-13 RX ADMIN — OXYCODONE HYDROCHLORIDE 5 MG: 5 TABLET ORAL at 11:15

## 2019-11-13 RX ADMIN — PROPOFOL 170 MG: 10 INJECTION, EMULSION INTRAVENOUS at 09:03

## 2019-11-13 RX ADMIN — SODIUM CHLORIDE, POTASSIUM CHLORIDE, SODIUM LACTATE AND CALCIUM CHLORIDE: 600; 310; 30; 20 INJECTION, SOLUTION INTRAVENOUS at 08:56

## 2019-11-13 RX ADMIN — DEXAMETHASONE SODIUM PHOSPHATE 4 MG: 4 INJECTION, SOLUTION INTRA-ARTICULAR; INTRALESIONAL; INTRAMUSCULAR; INTRAVENOUS; SOFT TISSUE at 09:03

## 2019-11-13 ASSESSMENT — ENCOUNTER SYMPTOMS: ORTHOPNEA: 0

## 2019-11-13 NOTE — DISCHARGE INSTRUCTIONS
Same Day Surgery Discharge Instructions for  Sedation and General Anesthesia       It's not unusual to feel dizzy, light-headed or faint for up to 24 hours after surgery or while taking pain medication.  If you have these symptoms: sit for a few minutes before standing and have someone assist you when you get up to walk or use the bathroom.      You should rest and relax for the next 24 hours. We recommend you make arrangements to have an adult stay with you for at least 24 hours after your discharge.  Avoid hazardous and strenuous activity.      DO NOT DRIVE any vehicle or operate mechanical equipment for 24 hours following the end of your surgery.  Even though you may feel normal, your reactions may be affected by the medication you have received.      Do not drink alcoholic beverages for 24 hours following surgery.       Slowly progress to your regular diet as you feel able. It's not unusual to feel nauseated and/or vomit after receiving anesthesia.  If you develop these symptoms, drink clear liquids (apple juice, ginger ale, broth, 7-up, etc. ) until you feel better.  If your nausea and vomiting persists for 24 hours, please notify your surgeon.        All narcotic pain medications, along with inactivity and anesthesia, can cause constipation. Drinking plenty of liquids and increasing fiber intake will help.      For any questions of a medical nature, call your surgeon.      Do not make important decisions for 24 hours.      If you had general anesthesia, you may have a sore throat for a couple of days related to the breathing tube used during surgery.  You may use Cepacol lozenges to help with this discomfort.  If it worsens or if you develop a fever, contact your surgeon.       If you feel your pain is not well managed with the pain medications prescribed by your surgeon, please contact your surgeon's office to let them know so they can address your concerns.             [facility]  Discharge Instructions: Post-Operative Laparoscopic Inguinal Hernia    ACTIVITY    Take frequent, short walks and increase your activity gradually.      Avoid strenuous physical activity. Limit your activity based on pain and discomfort  You may climb stairs.    You may drive without restrictions when you are not using any prescription pain medication and feel comfortable in a car.    You may return to work/school when you are comfortable without any prescription pain medication.    WOUND CARE    You may remove your outer dressing or Band-Aids and shower 48 hours after the surgery.  Pat your incisions dry and leave them open to air.  Re-apply dressing (Band-Aids or gauze/tape) as needed for comfort or drainage.    You may have steri-strips (looks like white tape) on your incision.  You may peel off the steri-strips 2 weeks after your surgery if they have not peeled off on their own.     Do not soak your incisions in a tub or pool for 2 weeks.     Do not apply any lotions, creams, or ointments to your incisions.    A ridge under your incisions is normal and will gradually resolve.    DIET    Start with liquids, then gradually resume your regular diet as tolerated.     Drink plenty of fluids to stay hydrated.    PAIN    Expect some tenderness and discomfort at the incision site(s).  Use the prescribed pain medication at your discretion.  Expect gradual resolution of your pain over several days.    If you are taking Norco or Percocet, do not take any additional acetaminophen/APAP/Tylenol.    Do not drink alcohol or drive while you are taking pain medications.    You may apply ice to your incisions in 20 minute intervals as needed for the next 48 hours.  After that time, consider switching to heat if you prefer.    EXPECTATIONS    You may notice air in your scrotum and/or penis after the surgery.  This is due to the gas used during the surgery.  You can expect some swelling and bruising involving the  scrotum and/or penis as well as numbness.  These symptoms are expected and should gradually resolve in the next few days.  You may use ice to help with the swelling and try placing a rolled hand towel below your scrotum to help alleviate scrotal discomfort.  If you develop significant testicular or penile pain, please call our office and speak with a nurse.    Pain medications can cause constipation.  Limit use when possible.  Take over the counter stool softener/stimulant, such as Colace or Senna, 1-2 times a day with plenty of water.  You may take a mild over the counter laxative, such as Miralax or a suppository, as needed.  You may take 1 oz. (2 tablespoons) Milk of Magnesia the evening following surgery to encourage bowel movement.  You may discontinue these medications once you are having regular bowel movements and/or are no longer taking your narcotic pain medication.     You may have shoulder or upper back discomfort due to the gas used in surgery.  This is temporary and should resolve in 48-72 hours.  Short, frequent walks may help with this.    FOLLOW UP    Our office will contact you approximately 2 weeks to check on your progress and answer any questions you may have.  If you are doing well, you will not need to return for a follow up appointment.  If any concerns are identified over the phone, we will help you make an appointment to see a provider.     If you have not received a phone call, have any questions or concerns, or would like to be seen, please call us at [phone] and ask to speak with our nurse.  We are located at [address] [place] [address].    CALL OUR OFFICE -[phone] IF YOU HAVE:     Chills or fever above 101 F.    Increased redness, warmth, or drainage at your incisions.    Significant bleeding.    Pain not relieved by your pain medication or rest.    Increasing pain after the first 48 hours.    Any other concerns or questions.    Revised January  2018

## 2019-11-13 NOTE — OP NOTE
General Surgery Operative Note    PREOPERATIVE DIAGNOSIS:  Right inguinal hernia     POSTOPERATIVE DIAGNOSIS: BILATERAL INGUINAL HERNIA    PROCEDURE:  LAPAROSCOPIC BILATERAL INGUINAL HERNIA REPAIR WITH MESH    ANESTHESIA:  General.    PREOPERATIVE MEDICATIONS:  ANCEF IV    SURGEON:  Hugh Bentley M.D.    ASSISTANT:  Omar Nieto MD    INDICATIONS:  Patient presented to the office with symptomatic inguinal hernia.  Therapeutic options were thoroughly discussed.  It has been elected to proceed with a laparoscopic repair.  The potential risks of bleeding, infection, neurovascular injury to the vas deferens or testicle were all reviewed in detail.  Patient's questions were all answered.  He wishes to proceed.    DESCRIPTION OF PROCEDURE: The patient was taken to the operating suite and uneventfully endotracheally intubated. The abdomen and groin were prepped and draped in a sterile fashion. Robison catheter was placed using sterile technique for bladder decompression. Surgeon initiated timeout was acknowledged.Physician's  Assistant was necessary for the case to assist in prepping, positioning, camera operation, retraction/exposure, and closure of port sites.  We made a curvilinear incision at the underside of the umbilicus and took this down through skin and subcutaneous tissue. We dissected down until the anterior rectus sheath was encountered. We incised along the fascia such that we were looking at the rectus muscle directly. The rectus muscle was retracted laterally and we inserted our dissecting balloon along the posterior rectus sheath toward the pubic bone. Once this was in place, we removed the obturator and inserted our camera. We then instilled air into the dissecting balloon and under direct visualization created our preperitoneal space. Dissecting balloon was then removed and our working balloon was placed and positioned. Two 5 mm trocars were placed along the lower midline under direct laparoscopic  visualization. We began our dissection on the right side. Using combination of sharp and blunt dissection, we created a plane behind the abdominal wall and the underlying peritoneum. This was done in a blunt and atraumatic fashion. The inferior epigastric vessels were visualized running along the underside of the abdominal wall.  We followed the epigastric vessels down until we were able to identify the internal ring. The spermatic cord was skeletonized.  Indirect hernia was present, sac was dissected from the cord and reduced.  We continued our dissection until we had an excellent space in the preperitoneal area. We then placed a Progrip mesh within this space.  Once we were satisfied with our position, we turned our attention toward the other side.   Using a combination of sharp and blunt dissection, we were able to dissect out the spermatic cord. We created a space between the peritoneum and the anterior abdominal wall. Once we had dissected out the spermatic cord, we were able to identify the vas and the spermatic vessels. We skeletonized these structures such that there was no intervening cord lipoma or hernia sac. Indirect hernia was present, sac was dissected from the cord and reduced.  Once we were satisfied with the space that we had, we deployed another Progrip hernia mesh. Once we were satisfied with the position, the mesh was held in place and the insufflation was released. The mesh was held in excellent position under direct visualization until the insufflation was completely out of the preperitoneal space. We then removed the 5 mm working ports under direct visualization. We removed the working balloon. The fascia at the working port site was closed anteriorly using a 0 Vicryl stitch. Local anesthetic was injected at the incision sites.  Skin incisions were all closed with subcuticular 4-0 Vicryl stitch. Benzoin and Steri-Strips were applied. Needle and sponge counts were correct. The patient tolerated  this well and was taken from the operating room to the recovery room in stable condition.       ESTIMATED BLOOD LOSS:  5cc    Hugh Bentley MD

## 2019-11-13 NOTE — ANESTHESIA CARE TRANSFER NOTE
Patient: Juan Higuera    Procedure(s):  LAPAROSCOPIC BILATERAL INGUINAL HERNIA REPAIR WITH MESH    Diagnosis: Right inguinal hernia [K40.90]  Diagnosis Additional Information: No value filed.    Anesthesia Type:   General, ETT     Note:  Airway :Face Mask  Patient transferred to:PACU  Comments: Neuromuscular blockade reversed after TOF 4/4, spontaneous respirations, adequate tidal volumes, followed commands to voice, oropharynx suctioned with soft flexible catheter, extubated atraumatically, airway patent after extubation.  Oxygen via facemask at 6 liters per minute to PACU. Oxygen tubing connected to wall O2 in PACU, SpO2, NiBP, and EKG monitors and alarms on and functioning, Vera Hugger warmer connected to patient gown, report on patient's clinical status given to PACU RN, RN questions answered. Handoff Report: Identifed the Patient, Identified the Reponsible Provider, Reviewed the pertinent medical history, Discussed the surgical course, Reviewed Intra-OP anesthesia mangement and issues during anesthesia, Set expectations for post-procedure period and Allowed opportunity for questions and acknowledgement of understanding      Vitals: (Last set prior to Anesthesia Care Transfer)    CRNA VITALS  11/13/2019 1002 - 11/13/2019 1032      11/13/2019             Resp Rate (set):  10                Electronically Signed By: LESLIE Rudolph CRNA  November 13, 2019  10:32 AM

## 2019-11-13 NOTE — ANESTHESIA PREPROCEDURE EVALUATION
Anesthesia Pre-Procedure Evaluation    Patient: Juan Higuera   MRN: [id number] : 1940          Preoperative Diagnosis: Right inguinal hernia [K40.90]    Procedure(s):  LAPAROSCOPIC RIGHT INGUINAL HERNIA REPAIR, POSSIBLE BILATERAL REPAIR WITH MESH    Past Medical History:   Diagnosis Date     Arthritis      BPH (benign prostatic hyperplasia)      Colon polyps      Edema      Facial swelling      [place]ill[place] (H)      History of shingles      [place]     bilat per ankur     Hypercholesterolemia      Hyperlipidaemia      Hypertension      Joint pain      Pituitary adenoma (H)      Plantar fasciitis      Shingles      Sleep apnea     uses cpap     Spinal stenosis      Spinal stenosis      Spinal stenosis      Vitreous hemorrhage (H)      Past Surgical History:   Procedure Laterality Date     APPENDECTOMY       BLEPHAROPLASTY BILATERAL Bilateral 2017    Procedure: BLEPHAROPLASTY BILATERAL;  BILATERAL UPPER LID BLEPHAROPLASTY;  Surgeon: Bryon Chandler MD;  Location: [facility]     COLONOSCOPY       COLONOSCOPY N/A 10/1/2015    Procedure: COLONOSCOPY;  Surgeon: Walter Arreola MD;  Location:  GI     GENITOURINARY SURGERY      VASECTOMY     GRAFT BONE FROM ILIAC CREST Right 2016    Procedure: GRAFT BONE FROM ILIAC CREST;  Surgeon: Fly Jauregui MD;  Location:  OR     HYDROCELECTOMY SCROTAL  2013    Procedure: HYDROCELECTOMY SCROTAL;  BILATERAL HYDROCELECTOMY;  Surgeon: Esdras Cintron MD;  Location:  [facility]     INCISION LIMBAL RELAXING, KERATOTOMY ARCUATE  2013    Procedure: INCISION LIMBAL RELAXING, KERATOTOMY ARCUATE;;  Surgeon: Bryon Chandler MD;  Location: [facility]     INCISION LIMBAL RELAXING, KERATOTOMY ARCUATE  10/7/2013    Procedure: INCISION LIMBAL RELAXING, KERATOTOMY ARCUATE;;  Surgeon: Bryon Chandler MD;  Location:  [facility]     LAMINECTOMY, FUSION LUMBAR ONE LEVEL, COMBINED N/A 2016    Procedure: COMBINED LAMINECTOMY, FUSION LUMBAR ONE LEVEL;   Surgeon: Fly Jauregui MD;  Location:  OR     PHACOEMULSIFICATION CLEAR CORNEA WITH STANDARD INTRAOCULAR LENS IMPLANT  9/16/2013    Procedure: PHACOEMULSIFICATION CLEAR CORNEA WITH STANDARD INTRAOCULAR LENS IMPLANT;  RIGHT EYE PHACOEMULSIFICATION CLEAR CORNEA WITH STANDARD INTRAOCULAR LENS IMPLANT AND LIMBAL RELAXING INCISION  ;  Surgeon: Bryon Chandler MD;  Location:  [facility]     PHACOEMULSIFICATION CLEAR CORNEA WITH STANDARD INTRAOCULAR LENS IMPLANT  10/7/2013    Procedure: PHACOEMULSIFICATION CLEAR CORNEA WITH STANDARD INTRAOCULAR LENS IMPLANT;  LEFT EYE PHACOEMULSIFICATION CLEAR CORNEA WITH STANDARD INTRAOCULAR LENS IMPLANT AND LIMBAL RELAXING INCISION ;  Surgeon: Bryon Chandler MD;  Location:  [facility]       Anesthesia Evaluation     . Pt has had prior anesthetic.            ROS/MED HX    ENT/Pulmonary:     (+)sleep apnea, uses CPAP , . .    Neurologic: Comment: 1. 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 No suprasellar mass effect.   2. Stable size and appearance of the right posterior frontal parafalcine meningioma with invasion of the superior sagittal sinus.   3. No new enhancing lesions or acute intracranial abnormalities.   4. Mild to moderate chronic small vessel ischemic changes and lacunar infarcts within the supratentorial white matter.     Foot drop, vitreous hemorrhage , h/o [place]- never had respiration or bulbar issues; left leg weakness      Cardiovascular: Comment: First degree AV block, LAD    (+) Dyslipidemia, hypertension----. : . . . :. .      (-) MURRELL, orthopnea/PND and syncope   METS/Exercise Tolerance:     Hematologic:         Musculoskeletal: Comment: L 4-5 fusion, neck stiffness   (+) arthritis,  -       GI/Hepatic:        (-) GERD   Renal/Genitourinary:     (+) chronic renal disease, type: CRI, BPH,       Endo:     (+) thyroid problem Obesity, .      Psychiatric:         Infectious Disease:         Malignancy:  "        Other:                          Physical Exam      Airway   Mallampati: II  TM distance: >3 FB  Neck ROM: full    Dental   (+) caps    Cardiovascular   Rhythm and rate: regular      Pulmonary    breath sounds clear to auscultation            Lab Results   Component Value Date    WBC 7.4 2016    HGB 12.8 (A) 2016    HCT 36.9 2016     2016     2016    POTASSIUM 4.3 2016    CHLORIDE 104 2016    CO2 30 (H) 2015    BUN 24 2016    CR 1.26 2016    GLC 89 2016    LAMONT 9.4 2016    ALBUMIN 4.2 2016    PROTTOTAL 6 2016    ALT 22 2016    AST 23 2016    ALKPHOS 56 2016    BILITOTAL 0.2 2016       Preop Vitals  BP Readings from Last 3 Encounters:   19 (!) 153/85   19 138/62   17 128/72    Pulse Readings from Last 3 Encounters:   19 70   17 60   17 62      Resp Readings from Last 3 Encounters:   19 16   17 18   16 18    SpO2 Readings from Last 3 Encounters:   19 98%   17 96%   16 92%      Temp Readings from Last 1 Encounters:   19 36  C (96.8  F) (Oral)    Ht Readings from Last 1 Encounters:   19 1.702 m (5' 7\")      Wt Readings from Last 1 Encounters:   19 93.6 kg (206 lb 6.4 oz)    Estimated body mass index is 32.33 kg/m  as calculated from the following:    Height as of 19: 1.702 m (5' 7\").    Weight as of this encounter: 93.6 kg (206 lb 6.4 oz).     Allergies   Allergen Reactions     Influenza Vaccine Live      HX OF GUILLAN BARRE     Peanuts [Nuts] Swelling     Shellfish Allergy      Shrimp      Tongue and mouth swelling     Social History     Tobacco Use     Smoking status: Former Smoker     Last attempt to quit: 1982     Years since quittin.8     Smokeless tobacco: Never Used   Substance Use Topics     Alcohol use: Yes     Comment: 2 per week     Prior to Admission medications    Medication Sig " Start Date End Date Taking? Authorizing Provider   Acetaminophen (TYLENOL PO) Take 1,000 mg by mouth 2 times daily Morning and Noon   Yes Reported, Patient   amLODIPine (NORVASC) 5 MG tablet Take 1 tablet (5 mg) by mouth daily 9/14/15  Yes Abhay Busch MD   Ascorbic Acid (VITAMIN C PO) Take 1,000 mg by mouth daily    Yes Reported, Patient   cabergoline (DOSTINEX) 0.5 MG tablet Take 0.25 mg by mouth twice a week   Yes Reported, Patient   labetalol (NORMODYNE) 200 MG tablet Take 1 tablet (200 mg) by mouth 2 times daily 9/22/16  Yes Abhay Busch MD   levothyroxine (SYNTHROID, LEVOTHROID) 12.5 MCG TABS Take 37 mcg by mouth every morning (before breakfast)    Yes Reported, Patient   lisinopril-hydrochlorothiazide (PRINZIDE,ZESTORETIC) 20-25 MG per tablet Take 1 tablet by mouth daily 1 tabs daily 11/18/14  Yes Camden Kerns MD   MINOXIDIL PO Take 5 mg by mouth daily (takes 0.5 x 10mg tablet)   Yes Reported, Patient   pantoprazole (PROTONIX) 40 MG EC tablet Take 40 mg by mouth daily   Yes Reported, Patient   spironolactone (ALDACTONE) 25 MG tablet Take 25 mg by mouth daily   Yes Reported, Patient   VITAMIN D, CHOLECALCIFEROL, PO Take 5,000 Units by mouth daily    Yes Reported, Patient   aspirin 81 MG chewable tablet Take 81 mg by mouth daily.    Reported, Patient   erythromycin (ROMYCIN) ophthalmic ointment Apply to operative eyelid(s), in eye(s) at bedtime 7/17/17   Bryon Chandler MD   naproxen sodium (ALEVE) 220 MG tablet Take 440 mg by mouth daily     Reported, Patient   UNKNOWN TO PATIENT Apply topically daily as needed (Eczema) Cream: medication name unknown    Reported, Patient     Current Facility-Administered Medications Ordered in Epic   Medication Dose Route Frequency Last Rate Last Dose     ceFAZolin (ANCEF) 1 g vial to attach to  ml bag for ADULT or 50 ml bag for PEDS  1 g Intravenous See Admin Instructions         ceFAZolin (ANCEF) intermittent infusion 2 g in 100 mL dextrose  PRE-MIX  2 g Intravenous Pre-Op/Pre-procedure x 1 dose         lactated ringers infusion   Intravenous Continuous         lidocaine 1 % 0.1-1 mL  0.1-1 mL Other Q1H PRN   2 mL at 11/13/19 0742     sodium chloride (PF) 0.9% PF flush 3 mL  3 mL Intracatheter q1 min prn   3 mL at 11/13/19 0742     No current Epic-ordered outpatient medications on file.       lactated ringers       Recent Labs   Lab Test 07/05/16 01/04/16  0632 01/07/15  0822 11/25/14  1207     --  140 142   POTASSIUM 4.3 4.0 4.7 4.6   CHLORIDE 104  --  103 103   CO2  --   --  30* 31*   ANIONGAP  --   --  11.7 12.6   GLC 89  --  107* 91   BUN 24  --  15 14   CR 1.26 1.09 1.06 1.03   LAMONT 9.4  --  9.7 9.9     Recent Labs   Lab Test 07/05/16 01/07/16  0730  01/17/12  1200   WBC 7.4  --   --  6.9   HGB 12.8* 10.0*   < > 14.3     --   --  238    < > = values in this interval not displayed.     Recent Labs   Lab Test 01/04/16  0632   ABO O   RH  Neg     Recent Labs   Lab Test 01/17/12  2054 01/17/12  1715 01/17/12  1200   TROPI <0.012 <0.012 <0.012     No results for input(s): PH, PCO2, PO2, HCO3 in the last 35434 hours.  No results for input(s): HCG in the last 30598 hours.  No results found for this or any previous visit (from the past 744 hour(s)).    RECENT LABS:       Anesthesia Plan      History & Physical Review  History and physical reviewed and following examination; no interval change.    ASA Status:  3 .        Plan for General and ETT   PONV prophylaxis:  Ondansetron (or other 5HT-3)  Additional equipment: Videolaryngoscope Glidescope, propofol infusion       Postoperative Care      Consents  Anesthetic plan, risks, benefits and alternatives discussed with:  Patient..                 Tangela Hatch MD

## 2019-11-13 NOTE — ANESTHESIA POSTPROCEDURE EVALUATION
Patient: Juan Higuera    Procedure(s):  LAPAROSCOPIC BILATERAL INGUINAL HERNIA REPAIR WITH MESH    Diagnosis:Right inguinal hernia [K40.90]  Diagnosis Additional Information: No value filed.    Anesthesia Type:  General, ETT    Note:  Anesthesia Post Evaluation    Patient location during evaluation: Bedside  Patient participation: Able to fully participate in evaluation  Level of consciousness: awake and alert  Pain management: adequate  Airway patency: patent  Cardiovascular status: acceptable  Respiratory status: acceptable  Hydration status: acceptable  PONV: none             Last vitals:  Vitals:    11/13/19 1100 11/13/19 1115 11/13/19 1230   BP: (!) 138/95 (!) 147/75 (!) 174/92   Pulse: 64 64    Resp: 22 13 16   Temp: 36.2  C (97.2  F) 36.4  C (97.5  F)    SpO2: 97% 95% 96%         Electronically Signed By: Abraham Jamil MD  November 13, 2019  4:58 PM

## 2019-11-22 ENCOUNTER — TELEPHONE (OUTPATIENT)
Dept: SURGERY | Facility: CLINIC | Age: 79
End: 2019-11-22

## 2019-11-22 NOTE — TELEPHONE ENCOUNTER
General Surgery Phone Call follow-up    11/22/2019      Doing well overall. Still has steri strips in place. Eating well. Only taking tylenol. Swelling is better but still a little bruising present. Having normal bowel movements. Will take steri strips off in the shower tomorrow. No issues with slowly increasing activity level.    Advised to call clinic with further questions or concerns.    Omar Nieto MD  Surgery

## 2019-12-03 NOTE — ADDENDUM NOTE
Addendum  created 12/03/19 0739 by Abraham Jamil MD    Attestation recorded in Intraprocedure, Intraprocedure Attestations filed

## 2020-12-21 ENCOUNTER — TELEPHONE (OUTPATIENT)
Dept: CARDIOLOGY | Facility: CLINIC | Age: 80
End: 2020-12-21

## 2020-12-21 NOTE — TELEPHONE ENCOUNTER
Patient called and left  requesting callback. RN left  advising patient to call clinic back to discuss his questions further.               9/18/17 Ov Dr. Ferguson     ASSESSMENT:   1.  Juan Higuera is a delightful 76-year-old male with a history of resistant hypertension.  His blood pressure is currently under much better control.  He does not have coarctation of aorta, nor does he have evidence of renal artery stenosis.  He notes that he does eat some salt but tries to reduce the amount.  His blood pressure is now very well controlled on his current medications.  The patient has evidence of mild dehydration with elevated sodium and creatinine.  These are borderline and I have asked the patient to try to drink some more fluids.  I have made no medication changes.  The patient's blood pressure and medications are stable enough where I will ask him to return to my office on a p.r.n. basis.  Instead, he will follow up with BERRY Ivory.  I am happy to see this patient at any time.   2.  The patient has a pituitary tumor with high prolactin levels.  He continues to take cabergoline twice a week and feels somewhat fatigued.  Evaluation with Endocrinology is ongoing.   3.  Hyperlipidemia.  The patient has been intolerant of statins in the past due to fatigue and myalgias.  He is refusing a statin and his lipids should be rechecked and in Deondre Rodriguez's office.      It is my pleasure to assist in the care of Mr. Juan Higuera.  I will see him again on a p.r.n. basis.  All his questions were answered to his satisfaction.  It has been my pleasure to assist in the care of Juan Higuera.      cc:   BERRY Ivory   [facility] [address]  [address]         EMILY FERGUSON MD, [facility]

## 2022-11-19 ENCOUNTER — HEALTH MAINTENANCE LETTER (OUTPATIENT)
Age: 82
End: 2022-11-19

## 2022-11-22 ENCOUNTER — VIRTUAL VISIT (OUTPATIENT)
Dept: UROLOGY | Facility: CLINIC | Age: 82
End: 2022-11-22
Payer: COMMERCIAL

## 2022-11-22 VITALS — HEIGHT: 67 IN | BODY MASS INDEX: 29.82 KG/M2 | WEIGHT: 190 LBS

## 2022-11-22 DIAGNOSIS — R31.0 GROSS HEMATURIA: Primary | ICD-10-CM

## 2022-11-22 PROCEDURE — 99204 OFFICE O/P NEW MOD 45 MIN: CPT | Mod: 95 | Performed by: PHYSICIAN ASSISTANT

## 2022-11-22 RX ORDER — ZINC GLUCONATE 50 MG
TABLET ORAL
COMMUNITY
Start: 2019-01-11

## 2022-11-22 RX ORDER — NAPROXEN SODIUM 220 MG
TABLET ORAL
COMMUNITY

## 2022-11-22 ASSESSMENT — PAIN SCALES - GENERAL: PAINLEVEL: NO PAIN (1)

## 2022-11-22 NOTE — PATIENT INSTRUCTIONS
- Urine cytology to look for abnormal cells. Please make an appointment at your local [place] lab to leave a urine sample.  - CT scan of the kidneys.   - Cystoscopy with the  urologist to evaluate the interior of the bladder. Follow up as recommended by the urologist.    CYSTOSCOPY    What is a Cystoscopy?  This is a procedure done to check for problems inside the bladder.  Problems may include polyps (growths), tumors, inflammation (swelling and redness) and other concerns.    The Urologist inserts a thin tube (called a cystoscope) into the bladder.  The tube is about the size of a pencil.  We will give you numbing medicine to reduce the pain or discomfort you may feel.    The Urologist will be able to see inside the bladder by filling the bladder with water.  The water makes it easier to see any problems that may be present. You will have a sense to need to urinate and this is normal.       How should I get ready for the exam?  Nothing to do to prepare. You may eat normally the day of the exam. There is no sedation, so you may drive yourself to and from if you can drive.       Please tell your doctor if:  You have a history of urinary tract infections.  You know that you have a tumor in your bladder.  You have bleeding problems.  You have any allergies.  You are or may be pregnant.      What happens after the exam?  You may go back to your normal diet and activity as you feel ready.    For the next two days after the exam, you may notice:  Some blood in your urine.  Some burning when you urinate (use the toilet).  An urge to urinate more often.  Bladder spasms.    These are normal after the procedure. They should go away on their own after a day or two.      You can help to relieve the above listed symptoms by:  Drinking 6 to 8 large glasses of water each day (includes drinks at meals).  This will help clear the urine.  Take warm baths to relieve pain and bladder spasms.  Do not add anything to the bath  water.  You may take Tylenol (acetaminophen) per label instructions for discomfort.

## 2022-11-22 NOTE — PROGRESS NOTES
Pt will meet you in [place]    Pt had gross hem 3 weeks ago  10-30-22--- abd pain by belly button  Pt had BM (loose) with a clot and when peeing he had gross hem and the next day he had a blood clot but not gross hem.  Pt went to doc and had a UA and he had micro hem.    Juan is a 81 year old who is being evaluated via a billable video visit.      How would you like to obtain your AVS? [id number]  If the video visit is dropped, the invitation should be resent by: Text to cell phone: [phone]  Will anyone else be joining your video visit? No        Video-Visit Details    Video Start Time: 10:10 AM    Type of service:  Video Visit    Video End Time:10:28 AM    Originating Location (pt. Location): Home        Distant Location (provider location):  On-site    Platform used for Video Visit: [employer]    CC: Hematuria.    HPI: It is a pleasure to see Mr. Juan Higuera, a very pleasant 81 year old male seen today via billable video visit, in consultation from Gaston Rodriguez PA-C, for evaluation of gross hematuria (clot).    Former smoking history. Voids with some difficulty (slow stream and sits to urinate, some urgency), and reports nocturia of 0-1.  He currently denies any dysuria, pyuria, hesitancy, intermittency, feelings of incomplete emptying, or any recent history of urinary tract infections or stones.    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)      Hernia, abdominal      History of shingles      [place]     bilat per ankur     Hypercholesterolemia       Hyperlipidaemia      Hypertension      Joint pain      Mumps      Pituitary adenoma (H)      Plantar fasciitis      Shingles      Sleep apnea     uses cpap     Spinal stenosis      Spinal stenosis      Spinal stenosis      Vitreous hemorrhage (H)      Past Surgical History:   Procedure Laterality Date     APPENDECTOMY       BLEPHAROPLASTY BILATERAL Bilateral 07/17/2017    Procedure: BLEPHAROPLASTY BILATERAL;  BILATERAL UPPER LID BLEPHAROPLASTY;  Surgeon: Bryon Chandler MD;  Location:  [facility]     COLONOSCOPY       COLONOSCOPY N/A 10/01/2015    Procedure: COLONOSCOPY;  Surgeon: Walter Arreola MD;  Location:  GI     GENITOURINARY SURGERY      VASECTOMY     GRAFT BONE FROM ILIAC CREST Right 01/04/2016    Procedure: GRAFT BONE FROM ILIAC CREST;  Surgeon: Fly Jauregui MD;  Location:  OR     HYDROCELECTOMY SCROTAL  07/30/2013    Procedure: HYDROCELECTOMY SCROTAL;  BILATERAL HYDROCELECTOMY;  Surgeon: Esdras Cintron MD;  Location: [facility]     INCISION LIMBAL RELAXING, KERATOTOMY ARCUATE  09/16/2013    Procedure: INCISION LIMBAL RELAXING, KERATOTOMY ARCUATE;;  Surgeon: Bryon Chandler MD;  Location:  [facility]     INCISION LIMBAL RELAXING, KERATOTOMY ARCUATE  10/07/2013    Procedure: INCISION LIMBAL RELAXING, KERATOTOMY ARCUATE;;  Surgeon: Bryon Chandler MD;  Location:  [facility]     LAMINECTOMY, FUSION LUMBAR ONE LEVEL, COMBINED N/A 01/04/2016    Procedure: COMBINED LAMINECTOMY, FUSION LUMBAR ONE LEVEL;  Surgeon: Fly Jauregui MD;  Location:  OR     LAPAROSCOPIC HERNIORRHAPHY INGUINAL Bilateral 11/13/2019    Procedure: LAPAROSCOPIC BILATERAL INGUINAL HERNIA REPAIR WITH MESH;  Surgeon: Hugh Bentley MD;  Location:  OR     PHACOEMULSIFICATION CLEAR CORNEA WITH STANDARD INTRAOCULAR LENS IMPLANT  09/16/2013    Procedure: PHACOEMULSIFICATION CLEAR CORNEA WITH STANDARD INTRAOCULAR LENS IMPLANT;  RIGHT EYE PHACOEMULSIFICATION CLEAR CORNEA WITH STANDARD INTRAOCULAR LENS IMPLANT AND LIMBAL  RELAXING INCISION  ;  Surgeon: Bryon Chandler MD;  Location: [facility]     PHACOEMULSIFICATION CLEAR CORNEA WITH STANDARD INTRAOCULAR LENS IMPLANT  10/07/2013    Procedure: PHACOEMULSIFICATION CLEAR CORNEA WITH STANDARD INTRAOCULAR LENS IMPLANT;  LEFT EYE PHACOEMULSIFICATION CLEAR CORNEA WITH STANDARD INTRAOCULAR LENS IMPLANT AND LIMBAL RELAXING INCISION ;  Surgeon: Bryon Chandler MD;  Location: [facility]     TESTICLE SURGERY       VASECTOMY       Current Outpatient Medications   Medication Sig Dispense Refill     cabergoline (DOSTINEX) 0.5 MG tablet Take 0.25 mg by mouth twice a week       labetalol (NORMODYNE) 200 MG tablet Take 1 tablet (200 mg) by mouth 2 times daily 1 tablet 0     levothyroxine (SYNTHROID, LEVOTHROID) 12.5 MCG TABS Take 37 mcg by mouth every morning (before breakfast)        lisinopril-hydrochlorothiazide (PRINZIDE,ZESTORETIC) 20-25 MG per tablet Take 1 tablet by mouth daily 1 tabs daily 180 tablet 3     Magnesium Oxide 140 MG CAPS        MINOXIDIL PO Take 5 mg by mouth daily (takes 0.5 x 10mg tablet)       naproxen sodium (ANAPROX) 220 MG tablet        naproxen sodium (ANAPROX) 220 MG tablet Take 440 mg by mouth daily        oxyCODONE (ROXICODONE) 5 MG tablet Take 1 tablet (5 mg) by mouth every 4 hours as needed for moderate to severe pain 10 tablet 0     pantoprazole (PROTONIX) 40 MG EC tablet Take 40 mg by mouth daily       spironolactone (ALDACTONE) 25 MG tablet Take 25 mg by mouth daily       UNKNOWN TO PATIENT Apply topically daily as needed (Eczema) Cream: medication name unknown       VITAMIN D, CHOLECALCIFEROL, PO Take 5,000 Units by mouth daily        zinc gluconate 50 MG tablet        Acetaminophen (TYLENOL PO)        Acetaminophen (TYLENOL PO) Take 1,000 mg by mouth 2 times daily Morning and Noon       amLODIPine (NORVASC) 5 MG tablet Take 1 tablet (5 mg) by mouth daily 90 tablet 3     Ascorbic Acid (VITAMIN C PO) Take 1,000 mg by mouth daily        aspirin 81 MG chewable tablet Take  81 mg by mouth daily.       erythromycin (ROMYCIN) ophthalmic ointment Apply to operative eyelid(s), in eye(s) at bedtime 1 g 1     Allergies   Allergen Reactions     Influenza Vaccine Live      HX OF GUILLAN BARRE     Peanut (Diagnostic)      Other reaction(s): Angioedema     Peanuts [Nuts] Swelling     Shellfish Allergy Other (See Comments)     Shrimp      Tongue and mouth swelling     FAMILY HISTORY: There is no reported history of genitourinary carcinoma.  There is no history of urolithiasis.    Social History     Socioeconomic History     Marital status:      Spouse name: Not on file     Number of children: Not on file     Years of education: Not on file     Highest education level: Not on file   Occupational History     Not on file   Tobacco Use     Smoking status: Former     Types: Cigarettes     Quit date: 1982     Years since quittin.9     Smokeless tobacco: Never   Substance and Sexual Activity     Alcohol use: Yes     Comment: 2 per week     Drug use: No     Sexual activity: Not on file   Other Topics Concern      Service Not Asked     Blood Transfusions Not Asked     Caffeine Concern No     Comment: 24 oz. coffee a day.     Occupational Exposure Not Asked     Hobby Hazards Not Asked     Sleep Concern Not Asked     Stress Concern Not Asked     Weight Concern Not Asked     Special Diet Yes     Comment: low sugar,low salt     Back Care Not Asked     Exercise No     Bike Helmet Not Asked     Seat Belt Yes     Self-Exams Not Asked     Parent/sibling w/ CABG, MI or angioplasty before 65F 55M? Not Asked   Social History Narrative     Not on file     Social Determinants of Health     Financial Resource Strain: Not on file   Food Insecurity: Not on file   Transportation Needs: Not on file   Physical Activity: Not on file   Stress: Not on file   Social Connections: Not on file   Intimate Partner Violence: Not on file   Housing Stability: Not on file       ROS: A 5 point ROS was obtained and  " otherwise negative except for that outlined above in the HPI.    PHYSICAL EXAM:   Vitals:    11/22/22 0944   Weight: 86.2 kg (190 lb)   Height: 1.702 m (5' 7\")     PSYCH: NAD  EYES: EOMI  NEURO: AAO x3      IMAGING: none recent    ASSESSMENT and PLAN:    Mr. Juan Higuera is a pleasant 81 year old male with gross hematuria.  The differential diagnosis at this point includes stone disease, infection, BPH, prostatitis, urothelial malignancy, renal disorder versus another yet unknown diagnosis.    At this time, recommend proceeding with comprehensive hematuria evaluation to include:  - Urine cytology to look for cells concerning for malignancy.  - CT urogram for upper tract imaging.  - Cystoscopy with the first available urologist to evaluate the interior of the bladder. Follow up for hematuria as recommended by urologist performing cystoscopic evaluation.  Thank you for allowing me to participate in Mr. Higuera's care.      26 minutes spent on the date of the encounter doing chart review, review of outside records, review of test results, interpretation of tests, patient visit and documentation.     Angela Engel PA-C  [facility]        "

## 2022-11-22 NOTE — LETTER
11/22/2022       RE: Juan Higuera  [address]  [place] [address]     Dear Colleague,    Thank you for referring your patient, Juan Higuera, to the [facility] UROLOGY CLINIC ALPHONSE at [facility]. Please see a copy of my visit note below.    Pt will meet you in [employer]    Pt had gross hem 3 weeks ago  10-30-22--- abd pain by belly button  Pt had BM (loose) with a clot and when peeing he had gross hem and the next day he had a blood clot but not gross hem.  Pt went to [facility] and had a UA and he had micro hem.    Juan is a 81 year old who is being evaluated via a billable video visit.      How would you like to obtain your AVS? [employer]  If the video visit is dropped, the invitation should be resent by: Text to cell phone: [phone]  Will anyone else be joining your video visit? No        Video-Visit Details    Video Start Time: 10:10 AM    Type of service:  Video Visit    Video End Time:10:28 AM    Originating Location (pt. Location): Home        Distant Location (provider location):  On-site    Platform used for Video Visit: [email or web address]    CC: Hematuria.    HPI: It is a pleasure to see . Juan Higuera, a very pleasant 81 year old male seen today via billable video visit, in consultation from Gaston Rodriguez PA-C, for evaluation of gross hematuria (clot).    Former smoking history. Voids with some difficulty (slow stream and sits to urinate, some urgency), and reports nocturia of 0-1.  He currently denies any dysuria, pyuria, hesitancy, intermittency, feelings of incomplete emptying, or any recent history of urinary tract infections or stones.    Hematuria Risk Factors:  Age >40: Yes   Smoking history: former  Occupational exposure to chemicals or dyes (ie, benzenes, aromatic amines): no  History of urologic disorder or disease: no  History of irritative voiding symptoms: no  History of urinary tract infection: no  Analgesic abuse: no  History of  pelvic irradiation: no    Past Medical History:   Diagnosis Date     Arthritis      BPH (benign prostatic hyperplasia)      Colon polyps      Edema      Facial swelling      Guillain-Montezuma (H)      Hernia, abdominal      History of shingles      [place]     bilat per ankur     Hypercholesterolemia      Hyperlipidaemia      Hypertension      Joint pain      Mumps      Pituitary adenoma (H)      Plantar fasciitis      Shingles      Sleep apnea     uses cpap     Spinal stenosis      Spinal stenosis      Spinal stenosis      Vitreous hemorrhage (H)      Past Surgical History:   Procedure Laterality Date     APPENDECTOMY       BLEPHAROPLASTY BILATERAL Bilateral 07/17/2017    Procedure: BLEPHAROPLASTY BILATERAL;  BILATERAL UPPER LID BLEPHAROPLASTY;  Surgeon: Bryon Chandler MD;  Location:  EC     COLONOSCOPY       COLONOSCOPY N/A 10/01/2015    Procedure: COLONOSCOPY;  Surgeon: Walter Arreola MD;  Location:  GI     GENITOURINARY SURGERY      VASECTOMY     GRAFT BONE FROM ILIAC CREST Right 01/04/2016    Procedure: GRAFT BONE FROM ILIAC CREST;  Surgeon: Fly Jauregui MD;  Location:  OR     HYDROCELECTOMY SCROTAL  07/30/2013    Procedure: HYDROCELECTOMY SCROTAL;  BILATERAL HYDROCELECTOMY;  Surgeon: Esdras Citnron MD;  Location:  SD     INCISION LIMBAL RELAXING, KERATOTOMY ARCUATE  09/16/2013    Procedure: INCISION LIMBAL RELAXING, KERATOTOMY ARCUATE;;  Surgeon: Bryon Chandler MD;  Location:  EC     INCISION LIMBAL RELAXING, KERATOTOMY ARCUATE  10/07/2013    Procedure: INCISION LIMBAL RELAXING, KERATOTOMY ARCUATE;;  Surgeon: Bryon Chandler MD;  Location:  EC     LAMINECTOMY, FUSION LUMBAR ONE LEVEL, COMBINED N/A 01/04/2016    Procedure: COMBINED LAMINECTOMY, FUSION LUMBAR ONE LEVEL;  Surgeon: Fly Jauregui MD;  Location:  OR     LAPAROSCOPIC HERNIORRHAPHY INGUINAL Bilateral 11/13/2019    Procedure: LAPAROSCOPIC BILATERAL INGUINAL HERNIA REPAIR WITH MESH;  Surgeon: Mc  Hugh Blank MD;  Location:  OR     PHACOEMULSIFICATION CLEAR CORNEA WITH STANDARD INTRAOCULAR LENS IMPLANT  09/16/2013    Procedure: PHACOEMULSIFICATION CLEAR CORNEA WITH STANDARD INTRAOCULAR LENS IMPLANT;  RIGHT EYE PHACOEMULSIFICATION CLEAR CORNEA WITH STANDARD INTRAOCULAR LENS IMPLANT AND LIMBAL RELAXING INCISION  ;  Surgeon: Bryon Chandler MD;  Location:  EC     PHACOEMULSIFICATION CLEAR CORNEA WITH STANDARD INTRAOCULAR LENS IMPLANT  10/07/2013    Procedure: PHACOEMULSIFICATION CLEAR CORNEA WITH STANDARD INTRAOCULAR LENS IMPLANT;  LEFT EYE PHACOEMULSIFICATION CLEAR CORNEA WITH STANDARD INTRAOCULAR LENS IMPLANT AND LIMBAL RELAXING INCISION ;  Surgeon: Bryon Chandler MD;  Location:  EC     TESTICLE SURGERY       VASECTOMY       Current Outpatient Medications   Medication Sig Dispense Refill     cabergoline (DOSTINEX) 0.5 MG tablet Take 0.25 mg by mouth twice a week       labetalol (NORMODYNE) 200 MG tablet Take 1 tablet (200 mg) by mouth 2 times daily 1 tablet 0     levothyroxine (SYNTHROID, LEVOTHROID) 12.5 MCG TABS Take 37 mcg by mouth every morning (before breakfast)        lisinopril-hydrochlorothiazide (PRINZIDE,ZESTORETIC) 20-25 MG per tablet Take 1 tablet by mouth daily 1 tabs daily 180 tablet 3     Magnesium Oxide 140 MG CAPS        MINOXIDIL PO Take 5 mg by mouth daily (takes 0.5 x 10mg tablet)       naproxen sodium (ANAPROX) 220 MG tablet        naproxen sodium (ANAPROX) 220 MG tablet Take 440 mg by mouth daily        oxyCODONE (ROXICODONE) 5 MG tablet Take 1 tablet (5 mg) by mouth every 4 hours as needed for moderate to severe pain 10 tablet 0     pantoprazole (PROTONIX) 40 MG EC tablet Take 40 mg by mouth daily       spironolactone (ALDACTONE) 25 MG tablet Take 25 mg by mouth daily       UNKNOWN TO PATIENT Apply topically daily as needed (Eczema) Cream: medication name unknown       VITAMIN D, CHOLECALCIFEROL, PO Take 5,000 Units by mouth daily        zinc gluconate 50 MG tablet         Acetaminophen (TYLENOL PO)        Acetaminophen (TYLENOL PO) Take 1,000 mg by mouth 2 times daily Morning and Noon       amLODIPine (NORVASC) 5 MG tablet Take 1 tablet (5 mg) by mouth daily 90 tablet 3     Ascorbic Acid (VITAMIN C PO) Take 1,000 mg by mouth daily        aspirin 81 MG chewable tablet Take 81 mg by mouth daily.       erythromycin (ROMYCIN) ophthalmic ointment Apply to operative eyelid(s), in eye(s) at bedtime 1 g 1     Allergies   Allergen Reactions     Influenza Vaccine Live      HX OF GUILLAN BARRE     Peanut (Diagnostic)      Other reaction(s): Angioedema     Peanuts [Nuts] Swelling     Shellfish Allergy Other (See Comments)     Shrimp      Tongue and mouth swelling     FAMILY HISTORY: There is no reported history of genitourinary carcinoma.  There is no history of urolithiasis.    Social History     Socioeconomic History     Marital status:      Spouse name: Not on file     Number of children: Not on file     Years of education: Not on file     Highest education level: Not on file   Occupational History     Not on file   Tobacco Use     Smoking status: Former     Types: Cigarettes     Quit date: 1982     Years since quittin.9     Smokeless tobacco: Never   Substance and Sexual Activity     Alcohol use: Yes     Comment: 2 per week     Drug use: No     Sexual activity: Not on file   Other Topics Concern      Service Not Asked     Blood Transfusions Not Asked     Caffeine Concern No     Comment: 24 oz. coffee a day.     Occupational Exposure Not Asked     Hobby Hazards Not Asked     Sleep Concern Not Asked     Stress Concern Not Asked     Weight Concern Not Asked     Special Diet Yes     Comment: low sugar,low salt     Back Care Not Asked     Exercise No     Bike Helmet Not Asked     Seat Belt Yes     Self-Exams Not Asked     Parent/sibling w/ CABG, MI or angioplasty before 65F 55M? Not Asked   Social History Narrative     Not on file     Social Determinants of Health  "    Financial Resource Strain: Not on file   Food Insecurity: Not on file   Transportation Needs: Not on file   Physical Activity: Not on file   Stress: Not on file   Social Connections: Not on file   Intimate Partner Violence: Not on file   Housing Stability: Not on file       ROS: A 5 point ROS was obtained and  otherwise negative except for that outlined above in the HPI.    PHYSICAL EXAM:   Vitals:    11/22/22 0944   Weight: 86.2 kg (190 lb)   Height: 1.702 m (5' 7\")     PSYCH: NAD  EYES: EOMI  NEURO: AAO x3      IMAGING: none recent    ASSESSMENT and PLAN:    Mr. Juan Higuera is a pleasant 81 year old male with gross hematuria.  The differential diagnosis at this point includes stone disease, infection, BPH, prostatitis, urothelial malignancy, renal disorder versus another yet unknown diagnosis.    At this time, recommend proceeding with comprehensive hematuria evaluation to include:  - Urine cytology to look for cells concerning for malignancy.  - CT urogram for upper tract imaging.  - Cystoscopy with the first available urologist to evaluate the interior of the bladder. Follow up for hematuria as recommended by urologist performing cystoscopic evaluation.  Thank you for allowing me to participate in Mr. Higuera's care.      26 minutes spent on the date of the encounter doing chart review, review of outside records, review of test results, interpretation of tests, patient visit and documentation.     Angela Engel PA-C  [facility]      "

## 2022-11-23 ENCOUNTER — LAB (OUTPATIENT)
Dept: LAB | Facility: CLINIC | Age: 82
End: 2022-11-23
Payer: COMMERCIAL

## 2022-11-23 DIAGNOSIS — R31.0 GROSS HEMATURIA: ICD-10-CM

## 2022-11-23 PROCEDURE — 88112 CYTOPATH CELL ENHANCE TECH: CPT

## 2022-11-27 LAB
PATH REPORT.COMMENTS IMP SPEC: NORMAL
PATH REPORT.FINAL DX SPEC: NORMAL
PATH REPORT.GROSS SPEC: NORMAL
PATH REPORT.MICROSCOPIC SPEC OTHER STN: NORMAL
PATH REPORT.RELEVANT HX SPEC: NORMAL

## 2022-11-29 ENCOUNTER — ANCILLARY PROCEDURE (OUTPATIENT)
Dept: CT IMAGING | Facility: CLINIC | Age: 82
End: 2022-11-29
Attending: PHYSICIAN ASSISTANT
Payer: COMMERCIAL

## 2022-11-29 DIAGNOSIS — R31.0 GROSS HEMATURIA: ICD-10-CM

## 2022-11-29 LAB
CREAT BLD-MCNC: 1.3 MG/DL (ref 0.7–1.3)
GFR SERPL CREATININE-BSD FRML MDRD: 55 ML/MIN/1.73M2

## 2022-11-29 PROCEDURE — 255N000002 HC RX 255 OP 636: Performed by: PHYSICIAN ASSISTANT

## 2022-11-29 PROCEDURE — 82565 ASSAY OF CREATININE: CPT

## 2022-11-29 PROCEDURE — 74178 CT ABD&PLV WO CNTR FLWD CNTR: CPT

## 2022-11-29 RX ADMIN — IOHEXOL 100 ML: 350 INJECTION, SOLUTION INTRAVENOUS at 14:51

## 2022-12-05 ENCOUNTER — OFFICE VISIT (OUTPATIENT)
Dept: UROLOGY | Facility: CLINIC | Age: 82
End: 2022-12-05
Payer: COMMERCIAL

## 2022-12-05 VITALS
HEIGHT: 67 IN | WEIGHT: 191 LBS | OXYGEN SATURATION: 99 % | DIASTOLIC BLOOD PRESSURE: 64 MMHG | BODY MASS INDEX: 29.98 KG/M2 | SYSTOLIC BLOOD PRESSURE: 118 MMHG | HEART RATE: 72 BPM

## 2022-12-05 DIAGNOSIS — R31.0 GROSS HEMATURIA: Primary | ICD-10-CM

## 2022-12-05 LAB
ALBUMIN UR-MCNC: NEGATIVE MG/DL
APPEARANCE UR: CLEAR
BILIRUB UR QL STRIP: NEGATIVE
COLOR UR AUTO: YELLOW
GLUCOSE UR STRIP-MCNC: NEGATIVE MG/DL
HGB UR QL STRIP: ABNORMAL
KETONES UR STRIP-MCNC: NEGATIVE MG/DL
LEUKOCYTE ESTERASE UR QL STRIP: ABNORMAL
NITRATE UR QL: NEGATIVE
PH UR STRIP: 6.5 [PH] (ref 5–7)
SP GR UR STRIP: 1.02 (ref 1–1.03)
UROBILINOGEN UR STRIP-ACNC: 0.2 E.U./DL

## 2022-12-05 PROCEDURE — 81003 URINALYSIS AUTO W/O SCOPE: CPT | Mod: QW | Performed by: UROLOGY

## 2022-12-05 PROCEDURE — 99213 OFFICE O/P EST LOW 20 MIN: CPT | Mod: 25 | Performed by: UROLOGY

## 2022-12-05 PROCEDURE — 52000 CYSTOURETHROSCOPY: CPT | Performed by: UROLOGY

## 2022-12-05 RX ORDER — LIDOCAINE HYDROCHLORIDE 20 MG/ML
JELLY TOPICAL ONCE
Status: COMPLETED | OUTPATIENT
Start: 2022-12-05 | End: 2022-12-05

## 2022-12-05 RX ADMIN — LIDOCAINE HYDROCHLORIDE 5 ML: 20 JELLY TOPICAL at 09:20

## 2022-12-05 ASSESSMENT — PAIN SCALES - GENERAL: PAINLEVEL: NO PAIN (0)

## 2022-12-05 NOTE — LETTER
"12/5/2022       RE: Juan Higuera  [address]   [place] [address]     Dear Colleague,    Thank you for referring your patient, Juan Higuera, to the [facility] UROLOGY CLINIC ALPHONSE at [facility]. Please see a copy of my visit note below.    [place]   CHIEF COMPLAINT   It was my pleasure to see Juan Higuera who is a 81 year old male for follow-up of gross hematuria .      HPI   Juan Higuera is a very pleasant 81 year old male     Seen initially by Angela Engel - 11/22/2022:  HPI: It is a pleasure to see . Juan Higuera, a very pleasant 81 year old male seen today via billable video visit, in consultation from Gaston Rodriguez PA-C, for evaluation of gross hematuria (clot).     Former smoking history. Voids with some difficulty (slow stream and sits to urinate, some urgency), and reports nocturia of 0-1.  He currently denies any dysuria, pyuria, hesitancy, intermittency, feelings of incomplete emptying, or any recent history of urinary tract infections or stones.     Hematuria Risk Factors:  Age >40: Yes         Smoking history: former  Occupational exposure to chemicals or dyes (ie, benzenes, aromatic amines): no  History of urologic disorder or disease: no  History of irritative voiding symptoms: no  History of urinary tract infection: no  Analgesic abuse: no  History of pelvic irradiation: no    TODAY 12/5/2022:  Follow-up today for cystoscopy  He notes mild LUTS which is not bothersome  He notes that his gross hematuria has resolved  He denies any prior urinary retention or UTIs    PHYSICAL EXAM  Patient is a 81 year old  male   Vitals: Blood pressure 118/64, pulse 72, height 1.702 m (5' 7\"), weight 86.6 kg (191 lb), SpO2 99 %.  Body mass index is 29.91 kg/m .  General Appearance Adult:   Alert, no acute distress, oriented  HENT: throat/mouth:normal, good dentition  Lungs: no respiratory distress, or pursed lip breathing  Heart: " No obvious jugular venous distension present  Abdomen: soft, nontender, no organomegaly or masses  Musculoskeltal: extremities normal, no peripheral edema  Skin: no suspicious lesions or rashes  Neuro: Alert, oriented, speech and mentation normal  Psych: affect and mood normal  : deferred to cystoscopy    UA RESULTS:  Recent Labs   Lab Test 12/05/22  0909   COLOR Yellow   APPEARANCE Clear   URINEGLC Negative   URINEBILI Negative   URINEKETONE Negative   SG 1.020   UBLD Trace*   URINEPH 6.5   PROTEIN Negative   UROBILINOGEN 0.2   NITRITE Negative   LEUKEST Trace*     CYTOLOGY:  Final Diagnosis   Urine, cytology:                 Interpretation:                  Negative for High Grade Urothelial Carcinoma.     Creatinine   Date Value Ref Range Status   07/05/2016 1.26 mg/dL Final     Creatinine POCT   Date Value Ref Range Status   11/29/2022 1.3 0.7 - 1.3 mg/dL Final        IMAGING:  All pertinent imaging reviewed:    All imaging studies reviewed by me.  I personally reviewed these imaging films.  A formal report from radiology will follow.    CT UROGRAM 11/29/2022:  FINDINGS:   LOWER CHEST: Normal.     HEPATOBILIARY: Normal.     PANCREAS: Normal.     SPLEEN: Multiple calcified splenic granulomas.     ADRENAL GLANDS: Normal.     RIGHT KIDNEY/URETER: Several nonobstructing calculi at the lower pole of the right kidney the largest measuring 4 mm (series 3, image 108). No suspicious masses in the renal cortex. Multiple parapelvic cysts requiring no specific follow-up. No   hydronephrosis. No abnormal urothelial enhancement in the right ureter. No filling defects in the right renal collecting system. The right ureter is not opacified with contrast which limits its evaluation.     LEFT KIDNEY/URETER: No calculi, hydronephrosis or suspicious renal masses. Multiple parapelvic and cortical simple cyst requiring no specific follow-up. No abnormal urothelial enhancement in the ureter. No suspicious filling defects in the  renal   collecting system and ureter.     BLADDER: No urinary bladder calculi. The enlarged median lobe of the prostate gland indents the urinary bladder base. Small bladder diverticulum measuring 1 cm in the posterior bladder wall (series 6, image 181) and few bladder trabeculations. No   discrete mass or focal wall thickening.     BOWEL: Extensive sigmoid diverticulosis with long segment wall thickening in the sigmoid colon, overall progressed since 2017 (series 6, image 166). No small bowel or colonic obstruction or inflammatory changes.     LYMPH NODES: Few mildly enlarged perisigmoid/left pelvic wall lymph nodes measuring about 9 mm (series 6, image 166).     VASCULATURE: No abdominal aortic aneurysm.     PELVIC ORGANS: Prostatomegaly with the prostate gland measuring 6.0 x 6.0 cm.     MUSCULOSKELETAL: Instrumented fusion in the lumbar spine. No suspicious lesions in the bones.                                                        IMPRESSION:  1.  Few nonobstructing right renal calculi measuring up to 4 mm.  2.  No suspicious renal masses or urothelial lesions in the upper tracts.  3.  Multiple bilateral parapelvic cysts requiring no specific follow-up. No hydronephrosis.  4.  Prostatomegaly with the median lobe of the prostate indenting the urinary bladder base. Few bladder wall trabeculations and small bladder diverticuli.  5.  Extensive sigmoid diverticulosis. Long segment wall mild thickening in the sigmoid colon may be related to chronic sigmoid diverticulitis. Correlate with colonoscopy to exclude underlying masses.  6.  Borderline enlarged left pelvic sidewall lymph node is indeterminate, may be reactive.          ASSESSMENT and PLAN  80-year-old man with recent episode of gross hematuria with evidence of BPH and LUTS    Gross hematuria  - I reviewed his labs which are notable for a normal urine cytology  - I reviewed his CT scan and reviewed these images personally.  I agree with radiologist  interpretation that there are no upper tract abnormalities concerning for malignancy.  He does have several peripelvic cysts.  - Cystoscopy today with no evidence of intravesical abnormality concerning for malignancy.  He does have a significantly enlarged prostate which is likely the source of his prior gross hematuria.  - No further work-up is needed for his gross hematuria    BPH with LUTS  - We discussed the pathophysiology of the bladder and the prostate and the normal changes associated with the development of BPH and LUTS  - He has very minimal symptoms  - Cystoscopy today with evidence of significant trilobar hypertrophy with a large protruding median lobe and moderate to severe trabeculation.  We discussed the options for tamsulosin with or without finasteride.  However, given his very mild LUTS he is not interested in medical management at this time  -Follow-up in 6 months with BERRY Morrison for symptom check    Time spent: 20 minutes spent on the date of the encounter doing chart review, history and exam, documentation and further activities as noted above.  This was in addition to cystoscopy time    Alexi Anderson MD   Urology  [facility]  [facility] Phone: [phone]  [facility] Phone: [phone]

## 2022-12-05 NOTE — PATIENT INSTRUCTIONS
"AFTER YOUR CYSTOSCOPY  ?  ?  You have just completed a cystoscopy, or \"cysto\", which allowed your physician to learn more about your bladder (or to remove a stent placed after surgery). We suggest that you continue to avoid caffeine, fruit juice, and alcohol for the next 24 hours, however, you are encouraged to return to your normal activities.  ?  ?  A few things that are considered normal after your cystoscopy:  ?  * small amount of bleeding (or spotting) that clears within the next 24 hours  ?  * slight burning sensation with urination  ?  * sensation of needing to void (urinate) more frequently  ?  * the feeling of \"air\" in your urine  ?  * mild discomfort that is relieved with Tylenol    * bladder spasms  ?  ?  ?  Please contact our office promptly if you:  ?  * develop a fever above 101 degrees  ?  * are unable to urinate  ?  * develop bright red blood that does not stop  ?  * experience severe pain or swelling  ?  ?  ?  And of course, please contact our office with any concerns or questions [phone].  ?    AFTER YOUR CYSTOSCOPY        You have just completed a cystoscopy, or \"cysto\", which allowed your physician to learn more about your bladder (or to remove a stent placed after surgery). We suggest that you continue to avoid caffeine, fruit juice, and alcohol for the next 24 hours, however, you are encouraged to return to your normal activities.         A few things that are considered normal after your cystoscopy:     * Small amount of bleeding (or spotting) that clears within the next 24 hours     * Slight burning sensation with urination     * Sensation to of needing to avoid more frequently     * The feeling of \"air\" in your urine     * Mild discomfort that is relieved with Tylenol        Please contact our office promptly if you:     * Develop a fever above 101 degrees     * Are unable to urinate     * Develop bright red blood that does not stop     * Severe pain or swelling         Please contact " our office with any concerns or questions @[facility].

## 2022-12-05 NOTE — PROGRESS NOTES
"[facility]   CHIEF COMPLAINT   It was my pleasure to see Juan Higuera who is a 81 year old male for follow-up of gross hematuria .      HPI   Juan Higuera is a very pleasant 81 year old male     Seen initially by Angela Engel - 11/22/2022:  HPI: It is a pleasure to see Mr. Juan Higuera, a very pleasant 81 year old male seen today via billable video visit, in consultation from Gaston Rodriguez PA-C, for evaluation of gross hematuria (clot).     Former smoking history. Voids with some difficulty (slow stream and sits to urinate, some urgency), and reports nocturia of 0-1.  He currently denies any dysuria, pyuria, hesitancy, intermittency, feelings of incomplete emptying, or any recent history of urinary tract infections or stones.     Hematuria Risk Factors:  Age >40: Yes         Smoking history: former  Occupational exposure to chemicals or dyes (ie, benzenes, aromatic amines): no  History of urologic disorder or disease: no  History of irritative voiding symptoms: no  History of urinary tract infection: no  Analgesic abuse: no  History of pelvic irradiation: no    TODAY 12/5/2022:  Follow-up today for cystoscopy  He notes mild LUTS which is not bothersome  He notes that his gross hematuria has resolved  He denies any prior urinary retention or UTIs    PHYSICAL EXAM  Patient is a 81 year old  male   Vitals: Blood pressure 118/64, pulse 72, height 1.702 m (5' 7\"), weight 86.6 kg (191 lb), SpO2 99 %.  Body mass index is 29.91 kg/m .  General Appearance Adult:   Alert, no acute distress, oriented  HENT: throat/mouth:normal, good dentition  Lungs: no respiratory distress, or pursed lip breathing  Heart: No obvious jugular venous distension present  Abdomen: soft, nontender, no organomegaly or masses  Musculoskeltal: extremities normal, no peripheral edema  Skin: no suspicious lesions or rashes  Neuro: Alert, oriented, speech and mentation normal  Psych: affect and mood normal  : deferred to cystoscopy    UA " RESULTS:  Recent Labs   Lab Test 12/05/22  0909   COLOR Yellow   APPEARANCE Clear   URINEGLC Negative   URINEBILI Negative   URINEKETONE Negative   SG 1.020   UBLD Trace*   URINEPH 6.5   PROTEIN Negative   UROBILINOGEN 0.2   NITRITE Negative   LEUKEST Trace*     CYTOLOGY:  Final Diagnosis   Urine, cytology:                 Interpretation:                  Negative for High Grade Urothelial Carcinoma.     Creatinine   Date Value Ref Range Status   07/05/2016 1.26 mg/dL Final     Creatinine POCT   Date Value Ref Range Status   11/29/2022 1.3 0.7 - 1.3 mg/dL Final        IMAGING:  All pertinent imaging reviewed:    All imaging studies reviewed by me.  I personally reviewed these imaging films.  A formal report from radiology will follow.    CT UROGRAM 11/29/2022:  FINDINGS:   LOWER CHEST: Normal.     HEPATOBILIARY: Normal.     PANCREAS: Normal.     SPLEEN: Multiple calcified splenic granulomas.     ADRENAL GLANDS: Normal.     RIGHT KIDNEY/URETER: Several nonobstructing calculi at the lower pole of the right kidney the largest measuring 4 mm (series 3, image 108). No suspicious masses in the renal cortex. Multiple parapelvic cysts requiring no specific follow-up. No   hydronephrosis. No abnormal urothelial enhancement in the right ureter. No filling defects in the right renal collecting system. The right ureter is not opacified with contrast which limits its evaluation.     LEFT KIDNEY/URETER: No calculi, hydronephrosis or suspicious renal masses. Multiple parapelvic and cortical simple cyst requiring no specific follow-up. No abnormal urothelial enhancement in the ureter. No suspicious filling defects in the renal   collecting system and ureter.     BLADDER: No urinary bladder calculi. The enlarged median lobe of the prostate gland indents the urinary bladder base. Small bladder diverticulum measuring 1 cm in the posterior bladder wall (series 6, image 181) and few bladder trabeculations. No   discrete mass or focal  wall thickening.     BOWEL: Extensive sigmoid diverticulosis with long segment wall thickening in the sigmoid colon, overall progressed since 2017 (series 6, image 166). No small bowel or colonic obstruction or inflammatory changes.     LYMPH NODES: Few mildly enlarged perisigmoid/left pelvic wall lymph nodes measuring about 9 mm (series 6, image 166).     VASCULATURE: No abdominal aortic aneurysm.     PELVIC ORGANS: Prostatomegaly with the prostate gland measuring 6.0 x 6.0 cm.     MUSCULOSKELETAL: Instrumented fusion in the lumbar spine. No suspicious lesions in the bones.                                                        IMPRESSION:  1.  Few nonobstructing right renal calculi measuring up to 4 mm.  2.  No suspicious renal masses or urothelial lesions in the upper tracts.  3.  Multiple bilateral parapelvic cysts requiring no specific follow-up. No hydronephrosis.  4.  Prostatomegaly with the median lobe of the prostate indenting the urinary bladder base. Few bladder wall trabeculations and small bladder diverticuli.  5.  Extensive sigmoid diverticulosis. Long segment wall mild thickening in the sigmoid colon may be related to chronic sigmoid diverticulitis. Correlate with colonoscopy to exclude underlying masses.  6.  Borderline enlarged left pelvic sidewall lymph node is indeterminate, may be reactive.          ASSESSMENT and PLAN  80-year-old man with recent episode of gross hematuria with evidence of BPH and LUTS    Gross hematuria  - I reviewed his labs which are notable for a normal urine cytology  - I reviewed his CT scan and reviewed these images personally.  I agree with radiologist interpretation that there are no upper tract abnormalities concerning for malignancy.  He does have several peripelvic cysts.  - Cystoscopy today with no evidence of intravesical abnormality concerning for malignancy.  He does have a significantly enlarged prostate which is likely the source of his prior gross  hematuria.  - No further work-up is needed for his gross hematuria    BPH with LUTS  - We discussed the pathophysiology of the bladder and the prostate and the normal changes associated with the development of BPH and LUTS  - He has very minimal symptoms  - Cystoscopy today with evidence of significant trilobar hypertrophy with a large protruding median lobe and moderate to severe trabeculation.  We discussed the options for tamsulosin with or without finasteride.  However, given his very mild LUTS he is not interested in medical management at this time  -Follow-up in 6 months with BERRY Morrison for symptom check    Time spent: 20 minutes spent on the date of the encounter doing chart review, history and exam, documentation and further activities as noted above.  This was in addition to cystoscopy time    Alexi Anderson MD   Urology  [facility]  [facility] Phone: [phone]  [facility] Phone: [phone]

## 2022-12-05 NOTE — NURSING NOTE
Chief Complaint   Patient presents with     Gross-Hematuria     Patient here today for Cystoscopy             UA RESULTS:  Recent Labs   Lab Test 12/05/22  0909   COLOR Yellow   APPEARANCE Clear   URINEGLC Negative   URINEBILI Negative   URINEKETONE Negative   SG 1.020   UBLD Trace*   URINEPH 6.5   PROTEIN Negative   UROBILINOGEN 0.2   NITRITE Negative   LEUKEST Trace*     Prior to the start of the procedure and with procedural staff participation, I verbally confirmed the patient s identity using two indicators, relevant allergies, that the procedure was appropriate and matched the consent or emergent situation, and that the correct equipment/implants were available. Immediately prior to starting the procedure I conducted the Time Out with the procedural staff and re-confirmed the patient s name, procedure, and site/side. I have wiped the patient off with the povidone-Iodine solution, draped them,  used Lidocaine hydrochloride jelly, and instilled sterile water into the bladder. (The Joint Commission universal protocol was followed.)  Yes    Sedation (Moderate or Deep): None    5mL 2% lidocaine hydrochloride Urojet instilled into urethra.    NDC# [address]-2  Lot #: [id number]  Expiration Date:  02/24        [place] [employer]ILVIA

## 2022-12-05 NOTE — PROCEDURES
CYSTOSCOPY PROCEDURE NOTE:    Juan Higuera is a 81 year old male  who presents with gross hematuria for cystoscopy.    Pt ID verified with patient: Yes     Procedure verified with patient: Yes     Procedure confirmed with physician and support staff: Yes     Consent form confirmed with physician and support staff.    Sign In  History and Physical Exam reviewed .  Informed Consent Discussed: Yes   Sign in Communication: Yes   Time Out:  Team Confirms the Correct Patient, Correct Procedure; Yes , Correct Site and Site Marking, Correct Position (if applicable).    Affirmation of Time Out: Yes   Sign Out:  Sign Out Discussion: Yes   Physician: Alexi Anderson MD    A urinalysis was performed revealing no evidence of infection.    The benefits, risks, alternatives of the cystoscopy procedure and personnel were discussed with the patient. The verbal consent was obtained and the patient agrees to proceed.      Description of procedure:   After fully informed, voluntary consent was obtained, the patient was brought into the procedure room, identified and placed in a supine position on the cystoscopy table.  The groin/scrotum were prepped with betadine and draped in a sterile fashion.  Urojet lidocaine gel was introduced.  A 15F flexible cystoscope was inserted into the urethra, and the bladder and urethra wereexamined in a systematic manner.  The patient tolerated the procedure well and there were no complications.      Cystoscopic findings:  The urethra was normal without strictures.  The prostate was 5-6cm long and demonstrated significant bilobar hypertrophy.  There was a large median lobe.  The external sphincter coapted normally and the bladder neck was distorted due to the median lobe. The bladder was  entered and careful pan endoscopy was carried out. The posterior, superior and lateral walls and dome of the bladder were all well visualized and the scope was retroflexed upon itself..  There was moderate/severe  trabeculation.  There were no neoplasms, stones, or diverticula identifed.  The ureteric orifices were normal in position and number and effluxing clear urine.    Assessment/Plan:   Juan Higuera is a 81 year old male with a history of gross hematuria now with evidence for BPH       - see clinic note    Alexi Anderson MD

## 2023-06-05 ENCOUNTER — OFFICE VISIT (OUTPATIENT)
Dept: UROLOGY | Facility: CLINIC | Age: 83
End: 2023-06-05
Payer: COMMERCIAL

## 2023-06-05 VITALS — SYSTOLIC BLOOD PRESSURE: 122 MMHG | OXYGEN SATURATION: 99 % | DIASTOLIC BLOOD PRESSURE: 70 MMHG | HEART RATE: 58 BPM

## 2023-06-05 DIAGNOSIS — N40.1 BPH WITH OBSTRUCTION/LOWER URINARY TRACT SYMPTOMS: ICD-10-CM

## 2023-06-05 DIAGNOSIS — N13.8 BPH WITH OBSTRUCTION/LOWER URINARY TRACT SYMPTOMS: ICD-10-CM

## 2023-06-05 DIAGNOSIS — R31.0 GROSS HEMATURIA: Primary | ICD-10-CM

## 2023-06-05 PROCEDURE — 99214 OFFICE O/P EST MOD 30 MIN: CPT | Performed by: PHYSICIAN ASSISTANT

## 2023-06-05 ASSESSMENT — PAIN SCALES - GENERAL: PAINLEVEL: NO PAIN (0)

## 2023-06-05 NOTE — PROGRESS NOTES
[facility]   CHIEF COMPLAINT   It was my pleasure to see Juan Higuera who is a 82 year old male for follow-up of gross hematuria and LUTS.      HPI   Juan Higuera is a very pleasant 82 year old male     Seen initially - 11/22/2022:  HPI: It is a pleasure to see Mr. Juan Higuera, a very pleasant 81 year old male seen today via billable video visit, in consultation from Gaston Rodriguez PA-C, for evaluation of gross hematuria (clot).     Former smoking history. Voids with some difficulty (slow stream and sits to urinate, some urgency), and reports nocturia of 0-1.  He currently denies any dysuria, pyuria, hesitancy, intermittency, feelings of incomplete emptying, or any recent history of urinary tract infections or stones.     Hematuria Risk Factors:  Age >40: Yes         Smoking history: former  Occupational exposure to chemicals or dyes (ie, benzenes, aromatic amines): no  History of urologic disorder or disease: no  History of irritative voiding symptoms: no  History of urinary tract infection: no  Analgesic abuse: no  History of pelvic irradiation: no    12/5/2022:  Follow-up today for cystoscopy  He notes mild LUTS which is not bothersome  He notes that his gross hematuria has resolved  He denies any prior urinary retention or UTIs    TODAY 6/5/23:  6 mo symptom check. Gross hematuria eval benign (enlarged prostate).  Satisfied with his urinary control.       PHYSICAL EXAM  Patient is a 82 year old  male     /70   Pulse 58   SpO2 99%     General Appearance Adult:   Alert, no acute distress, oriented  HENT: throat/mouth:normal, good dentition    UA RESULTS:  Recent Labs   Lab Test 12/05/22  0909   COLOR Yellow   APPEARANCE Clear   URINEGLC Negative   URINEBILI Negative   URINEKETONE Negative   SG 1.020   UBLD Trace*   URINEPH 6.5   PROTEIN Negative   UROBILINOGEN 0.2   NITRITE Negative   LEUKEST Trace*     CYTOLOGY:  Final Diagnosis   Urine, cytology:                 Interpretation:                   Negative for High Grade Urothelial Carcinoma.     Creatinine   Date Value Ref Range Status   07/05/2016 1.26 mg/dL Final     Creatinine POCT   Date Value Ref Range Status   11/29/2022 1.3 0.7 - 1.3 mg/dL Final        IMAGING:  All pertinent imaging reviewed:    All imaging studies reviewed by me.  I personally reviewed these imaging films.  A formal report from radiology will follow.    CT UROGRAM 11/29/2022:  FINDINGS:   LOWER CHEST: Normal.     HEPATOBILIARY: Normal.     PANCREAS: Normal.     SPLEEN: Multiple calcified splenic granulomas.     ADRENAL GLANDS: Normal.     RIGHT KIDNEY/URETER: Several nonobstructing calculi at the lower pole of the right kidney the largest measuring 4 mm (series 3, image 108). No suspicious masses in the renal cortex. Multiple parapelvic cysts requiring no specific follow-up. No   hydronephrosis. No abnormal urothelial enhancement in the right ureter. No filling defects in the right renal collecting system. The right ureter is not opacified with contrast which limits its evaluation.     LEFT KIDNEY/URETER: No calculi, hydronephrosis or suspicious renal masses. Multiple parapelvic and cortical simple cyst requiring no specific follow-up. No abnormal urothelial enhancement in the ureter. No suspicious filling defects in the renal   collecting system and ureter.     BLADDER: No urinary bladder calculi. The enlarged median lobe of the prostate gland indents the urinary bladder base. Small bladder diverticulum measuring 1 cm in the posterior bladder wall (series 6, image 181) and few bladder trabeculations. No   discrete mass or focal wall thickening.     BOWEL: Extensive sigmoid diverticulosis with long segment wall thickening in the sigmoid colon, overall progressed since 2017 (series 6, image 166). No small bowel or colonic obstruction or inflammatory changes.     LYMPH NODES: Few mildly enlarged perisigmoid/left pelvic wall lymph nodes measuring about 9 mm (series 6, image  166).     VASCULATURE: No abdominal aortic aneurysm.     PELVIC ORGANS: Prostatomegaly with the prostate gland measuring 6.0 x 6.0 cm.     MUSCULOSKELETAL: Instrumented fusion in the lumbar spine. No suspicious lesions in the bones.                                                        IMPRESSION:  1.  Few nonobstructing right renal calculi measuring up to 4 mm.  2.  No suspicious renal masses or urothelial lesions in the upper tracts.  3.  Multiple bilateral parapelvic cysts requiring no specific follow-up. No hydronephrosis.  4.  Prostatomegaly with the median lobe of the prostate indenting the urinary bladder base. Few bladder wall trabeculations and small bladder diverticuli.  5.  Extensive sigmoid diverticulosis. Long segment wall mild thickening in the sigmoid colon may be related to chronic sigmoid diverticulitis. Correlate with colonoscopy to exclude underlying masses.  6.  Borderline enlarged left pelvic sidewall lymph node is indeterminate, may be reactive.          ASSESSMENT and PLAN  82-year-old man with recent episode of gross hematuria with evidence of BPH and LUTS    Gross hematuria  - No further work-up is needed for his gross hematuria  -- No further gross hematuria    BPH with LUTS  - He has very minimal symptoms  - We reviewed options for tamsulosin if symptoms become irritating.   -Follow-up PRN    Time spent: 22 minutes spent on the date of the encounter doing chart review, history and exam, documentation and further activities as noted above.  This was in addition to cystoscopy time    Angela Engel PA-C  [facility]

## 2023-06-05 NOTE — LETTER
6/5/2023       RE: Juan Higuera  [address]  [place] [address]     Dear Colleague,    Thank you for referring your patient, Juan Higuera, to the [facility] at [facility]. Please see a copy of my visit note below.    [place]   CHIEF COMPLAINT   It was my pleasure to see Juan Higuera who is a 82 year old male for follow-up of gross hematuria and LUTS.      HPI   Juan Higuera is a very pleasant 82 year old male     Seen initially - 11/22/2022:  HPI: It is a pleasure to see MrLauren Higuera, a very pleasant 81 year old male seen today via billable video visit, in consultation from Gaston Rodriguez PA-C, for evaluation of gross hematuria (clot).     Former smoking history. Voids with some difficulty (slow stream and sits to urinate, some urgency), and reports nocturia of 0-1.  He currently denies any dysuria, pyuria, hesitancy, intermittency, feelings of incomplete emptying, or any recent history of urinary tract infections or stones.     Hematuria Risk Factors:  Age >40: Yes         Smoking history: former  Occupational exposure to chemicals or dyes (ie, benzenes, aromatic amines): no  History of urologic disorder or disease: no  History of irritative voiding symptoms: no  History of urinary tract infection: no  Analgesic abuse: no  History of pelvic irradiation: no    12/5/2022:  Follow-up today for cystoscopy  He notes mild LUTS which is not bothersome  He notes that his gross hematuria has resolved  He denies any prior urinary retention or UTIs    TODAY 6/5/23:  6 mo symptom check. Gross hematuria eval benign (enlarged prostate).  Satisfied with his urinary control.       PHYSICAL EXAM  Patient is a 82 year old  male     /70   Pulse 58   SpO2 99%     General Appearance Adult:   Alert, no acute distress, oriented  HENT: throat/mouth:normal, good dentition    UA RESULTS:  Recent Labs   Lab Test  12/05/22  0909   COLOR Yellow   APPEARANCE Clear   URINEGLC Negative   URINEBILI Negative   URINEKETONE Negative   SG 1.020   UBLD Trace*   URINEPH 6.5   PROTEIN Negative   UROBILINOGEN 0.2   NITRITE Negative   LEUKEST Trace*     CYTOLOGY:  Final Diagnosis   Urine, cytology:                 Interpretation:                  Negative for High Grade Urothelial Carcinoma.     Creatinine   Date Value Ref Range Status   07/05/2016 1.26 mg/dL Final     Creatinine POCT   Date Value Ref Range Status   11/29/2022 1.3 0.7 - 1.3 mg/dL Final        IMAGING:  All pertinent imaging reviewed:    All imaging studies reviewed by me.  I personally reviewed these imaging films.  A formal report from radiology will follow.    CT UROGRAM 11/29/2022:  FINDINGS:   LOWER CHEST: Normal.     HEPATOBILIARY: Normal.     PANCREAS: Normal.     SPLEEN: Multiple calcified splenic granulomas.     ADRENAL GLANDS: Normal.     RIGHT KIDNEY/URETER: Several nonobstructing calculi at the lower pole of the right kidney the largest measuring 4 mm (series 3, image 108). No suspicious masses in the renal cortex. Multiple parapelvic cysts requiring no specific follow-up. No   hydronephrosis. No abnormal urothelial enhancement in the right ureter. No filling defects in the right renal collecting system. The right ureter is not opacified with contrast which limits its evaluation.     LEFT KIDNEY/URETER: No calculi, hydronephrosis or suspicious renal masses. Multiple parapelvic and cortical simple cyst requiring no specific follow-up. No abnormal urothelial enhancement in the ureter. No suspicious filling defects in the renal   collecting system and ureter.     BLADDER: No urinary bladder calculi. The enlarged median lobe of the prostate gland indents the urinary bladder base. Small bladder diverticulum measuring 1 cm in the posterior bladder wall (series 6, image 181) and few bladder trabeculations. No   discrete mass or focal wall thickening.     BOWEL:  Extensive sigmoid diverticulosis with long segment wall thickening in the sigmoid colon, overall progressed since 2017 (series 6, image 166). No small bowel or colonic obstruction or inflammatory changes.     LYMPH NODES: Few mildly enlarged perisigmoid/left pelvic wall lymph nodes measuring about 9 mm (series 6, image 166).     VASCULATURE: No abdominal aortic aneurysm.     PELVIC ORGANS: Prostatomegaly with the prostate gland measuring 6.0 x 6.0 cm.     MUSCULOSKELETAL: Instrumented fusion in the lumbar spine. No suspicious lesions in the bones.                                                        IMPRESSION:  1.  Few nonobstructing right renal calculi measuring up to 4 mm.  2.  No suspicious renal masses or urothelial lesions in the upper tracts.  3.  Multiple bilateral parapelvic cysts requiring no specific follow-up. No hydronephrosis.  4.  Prostatomegaly with the median lobe of the prostate indenting the urinary bladder base. Few bladder wall trabeculations and small bladder diverticuli.  5.  Extensive sigmoid diverticulosis. Long segment wall mild thickening in the sigmoid colon may be related to chronic sigmoid diverticulitis. Correlate with colonoscopy to exclude underlying masses.  6.  Borderline enlarged left pelvic sidewall lymph node is indeterminate, may be reactive.          ASSESSMENT and PLAN  82-year-old man with recent episode of gross hematuria with evidence of BPH and LUTS    Gross hematuria  - No further work-up is needed for his gross hematuria  -- No further gross hematuria    BPH with LUTS  - He has very minimal symptoms  - We reviewed options for tamsulosin if symptoms become irritating.   -Follow-up PRN    Time spent: 22 minutes spent on the date of the encounter doing chart review, history and exam, documentation and further activities as noted above.  This was in addition to cystoscopy time    Angela Engel PA-C  [facility]

## 2023-06-05 NOTE — NURSING NOTE
Please notify the pt that his UA and UC were unremarkable from a urological standpoint.  Proceed with procedure as planned Pt denies any voiding trouble.  LESLIE English CMA

## 2024-08-13 ENCOUNTER — VIRTUAL VISIT (OUTPATIENT)
Dept: UROLOGY | Facility: CLINIC | Age: 84
End: 2024-08-13
Payer: COMMERCIAL

## 2024-08-13 DIAGNOSIS — R33.9 URINARY RETENTION: ICD-10-CM

## 2024-08-13 DIAGNOSIS — N40.1 BPH WITH OBSTRUCTION/LOWER URINARY TRACT SYMPTOMS: Primary | ICD-10-CM

## 2024-08-13 DIAGNOSIS — N20.1 URETERAL STONE: ICD-10-CM

## 2024-08-13 DIAGNOSIS — N13.8 BPH WITH OBSTRUCTION/LOWER URINARY TRACT SYMPTOMS: Primary | ICD-10-CM

## 2024-08-13 PROCEDURE — 99214 OFFICE O/P EST MOD 30 MIN: CPT | Mod: 95 | Performed by: PHYSICIAN ASSISTANT

## 2024-08-13 RX ORDER — TAMSULOSIN HYDROCHLORIDE 0.4 MG/1
0.4 CAPSULE ORAL DAILY
COMMUNITY
Start: 2024-08-12

## 2024-08-13 ASSESSMENT — PAIN SCALES - GENERAL: PAINLEVEL: NO PAIN (1)

## 2024-08-13 NOTE — NURSING NOTE
Is the patient currently in the state of [place]? YES    Visit mode:VIDEO    If the visit is dropped, the patient can be reconnected by: VIDEO VISIT: Text to cell phone:   Telephone Information:   Mobile [phone]    and VIDEO VISIT: Send to e-mail at: [email or web address]    Will anyone else be joining the visit? NO  (If patient encounters technical issues they should call [phone]    How would you like to obtain your AVS? MyChart    Are changes needed to the allergy or medication list? No    Are refills needed on medications prescribed by this physician? YES    Reason for visit: Follow Up    Palma ALCANTARA

## 2024-08-13 NOTE — PROGRESS NOTES
How would you like to obtain your AVS? MyChart  If the video visit is dropped, the invitation should be resent by: Text to cell phone: [phone]  Will anyone else be joining your video visit? No          Video-Visit Details    Type of service:  Video Visit     Originating Location (pt. Location): Home    Distant Location (provider location):  On-site  Platform used for Video Visit: [employer]  Start time: 3:05pm  End time: 3:24pm      RUBEN   CHIEF COMPLAINT   It was my pleasure to see Juan Higuera who is a 83 year old male for follow-up of gross hematuria and LUTS.      HPI   Juan Higuera is a very pleasant 83 year old male     Seen initially - 11/22/2022:  HPI: It is a pleasure to see Mr. Juan Higuera, a very pleasant 81 year old male seen today via billable video visit, in consultation from Gaston Rodriguez PA-C, for evaluation of gross hematuria (clot).     Former smoking history. Voids with some difficulty (slow stream and sits to urinate, some urgency), and reports nocturia of 0-1.  He currently denies any dysuria, pyuria, hesitancy, intermittency, feelings of incomplete emptying, or any recent history of urinary tract infections or stones.     Hematuria Risk Factors:  Age >40: Yes         Smoking history: former  Occupational exposure to chemicals or dyes (ie, benzenes, aromatic amines): no  History of urologic disorder or disease: no  History of irritative voiding symptoms: no  History of urinary tract infection: no  Analgesic abuse: no  History of pelvic irradiation: no    12/5/2022:  Follow-up today for cystoscopy  He notes mild LUTS which is not bothersome  He notes that his gross hematuria has resolved  He denies any prior urinary retention or UTIs    6/5/23:  6 mo symptom check. Gross hematuria eval benign (enlarged prostate).  Satisfied with his urinary control.     TODAY 8/13/24  Same day appt for ureteral stone (4mm distal right ureter with hydro) and retention. Recent admit to [facility].  Has known left parapelvic and cortical simple cyst. There are two additional nonobstructing stones seen at the lower pole of the right kidney measuring up to 5 millimeters (no left renal stones).  Cr 1.22, UA nitrite neg. Ca 9.8.  Started on tamsulosin. Robison was placed while at [facility] for bladder scan of 500cc. Urology recommended TOV in 5-7 days.     PHYSICAL EXAM  Patient is a 83 year old  male     There were no vitals taken for this visit.    General Appearance Adult:   Alert, no acute distress, oriented  HENT: EOMI      CYTOLOGY:  Final Diagnosis   Urine, cytology:                 Interpretation:                  Negative for High Grade Urothelial Carcinoma.     Creatinine   Date Value Ref Range Status   07/05/2016 1.26 mg/dL Final     Creatinine POCT   Date Value Ref Range Status   11/29/2022 1.3 0.7 - 1.3 mg/dL Final        IMAGING:  All pertinent imaging reviewed:    All imaging studies reviewed by me.  I personally reviewed these imaging films.  A formal report from radiology will follow.  Indication:   Flank pain, kidney stone suspected     Technique:   CT abdomen/pelvis without IV contrast     Comparison:   None     Findings:   Lower thorax:   No acute findings. 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     Abdomen/pelvis:   The liver, and gallbladder and biliary system are unremarkable. Multiple calcified granulomas in the spleen, likely sequela of remote granulomatous disease. The pancreas is unremarkable. No adrenal nodules.     There is cortical atrophy of the bilateral kidneys. There is either moderate bilateral hydronephrosis and/or multiple prominent parapelvic renal cysts, difficult to assess given lack of contrast-enhanced delayed phase imaging.  There is an obstructing 4 millimeter stone in the distal right ureter (series number 3, image 106), causing mild right-sided hydroureter. Two additional nonobstructing stones seen at the  lower pole of the right kidney measuring up to 5 millimeters. There are no stones in the left kidney or ureter.     No suspicious bladder masses or lesions. Small bladder diverticulum along the posterior wall to the left of midline measuring approximately 1.4 centimeters.     Prostatomegaly with mass effect upon the bladder.     There is no evidence of bowel obstruction or inflammation. The appendix is not visualized; however, there are no secondary signs to suggest acute appendicitis. Colonic diverticulosis without CT evidence of acute diverticulitis.     No free fluid or free air. No abdominopelvic lymphadenopathy.     No abdominal aortic aneurysm. Mild-to-moderate calcific atherosclerosis of the aortoiliac system.     No acute fracture or malalignment. There is some degenerative changes of the spine with posterior spinal fusion construct spanning L4-L5. No suspicious osseous lesions.       Impression:   1. Obstructing 4 millimeter stone in the distal right ureter (series number 3, image 106), causing mild right-sided hydroureter.   2. There is either moderate bilateral hydronephrosis and/or multiple prominent parapelvic renal cysts, difficult to assess given lack of contrast-enhanced delayed phase imaging. Recommend correlation with patient history and prior imaging if available.   3. There are no stones in the left kidney or ureter.   4. Small bladder diverticulum.   5. Prostatomegaly with mass effect upon the bladder.       Please note that all CT scans at this facility use dose modulation, iterative reconstruction, and/or weight-based dosing when appropriate to reduce radiation dose to as low as reasonably achievable.     Dictated by Esteban Rueda MD @ 8/10/2024 5:17:01 PM        ASSESSMENT and PLAN  83-year-old man with hx of gross hematuria with evidence of BPH and LUTS  New right distal ureteral stone (Right renal stones), urinary retention    Urinary retention  -- Continue Flomax  -- TOV with nurse in 5  days.     Ureteral stone  -- Continue tamsulosin  -- Strain urine  -- return precautions for ER reviewed (worsening pain, fever/chills).     Gross hematuria  - No further work-up is needed for his gross hematuria  -- No further gross hematuria    BPH with LUTS  - He has very minimal symptoms  - We reviewed options for tamsulosin if symptoms become irritating.       Follow up in 2-3 wks for symptom management, UA/PVR.       Time spent: 30 minutes spent on the date of the encounter doing chart review, history and exam, documentation and further activities as noted above.  This was in addition to cystoscopy time    Angela Engel PA-C  [facility]

## 2024-08-13 NOTE — LETTER
8/13/2024       RE: Juan Higuera  [address]  [place] [address]     Dear Colleague,    Thank you for referring your patient, Juan Higuera, to the [facility]OLOG[facility] ALPHONSE at [facility]. Please see a copy of my visit note below.    How would you like to obtain your AVS? MyChart  If the video visit is dropped, the invitation should be resent by: Text to cell phone: [phone]  Will anyone else be joining your video visit? No          Video-Visit Details    Type of service:  Video Visit     Originating Location (pt. Location): Home    Distant Location (provider location):  On-site  Platform used for Video Visit: [employer]  Start time: 3:05pm  End time: 3:24pm      RUBEN   CHIEF COMPLAINT   It was my pleasure to see Juan Higuera who is a 83 year old male for follow-up of gross hematuria and LUTS.      HPI   Juan Higuera is a very pleasant 83 year old male     Seen initially - 11/22/2022:  HPI: It is a pleasure to see Mr. Juan Higuera, a very pleasant 81 year old male seen today via billable video visit, in consultation from Gaston Rodriguez PA-C, for evaluation of gross hematuria (clot).     Former smoking history. Voids with some difficulty (slow stream and sits to urinate, some urgency), and reports nocturia of 0-1.  He currently denies any dysuria, pyuria, hesitancy, intermittency, feelings of incomplete emptying, or any recent history of urinary tract infections or stones.     Hematuria Risk Factors:  Age >40: Yes         Smoking history: former  Occupational exposure to chemicals or dyes (ie, benzenes, aromatic amines): no  History of urologic disorder or disease: no  History of irritative voiding symptoms: no  History of urinary tract infection: no  Analgesic abuse: no  History of pelvic irradiation: no    12/5/2022:  Follow-up today for cystoscopy  He notes mild LUTS which is not bothersome  He notes that his gross  hematuria has resolved  He denies any prior urinary retention or UTIs    6/5/23:  6 mo symptom check. Gross hematuria eval benign (enlarged prostate).  Satisfied with his urinary control.     TODAY 8/13/24  Same day appt for ureteral stone (4mm distal right ureter with hydro) and retention. Recent admit to [facility]. Has known left parapelvic and cortical simple cyst. There are two additional nonobstructing stones seen at the lower pole of the right kidney measuring up to 5 millimeters (no left renal stones).  Cr 1.22, UA nitrite neg. Ca 9.8.  Started on tamsulosin. Robison was placed while at [facility] for bladder scan of 500cc. Urology recommended TOV in 5-7 days.     PHYSICAL EXAM  Patient is a 83 year old  male     There were no vitals taken for this visit.    General Appearance Adult:   Alert, no acute distress, oriented  HENT: EOMI      CYTOLOGY:  Final Diagnosis   Urine, cytology:                 Interpretation:                  Negative for High Grade Urothelial Carcinoma.     Creatinine   Date Value Ref Range Status   07/05/2016 1.26 mg/dL Final     Creatinine POCT   Date Value Ref Range Status   11/29/2022 1.3 0.7 - 1.3 mg/dL Final        IMAGING:  All pertinent imaging reviewed:    All imaging studies reviewed by me.  I personally reviewed these imaging films.  A formal report from radiology will follow.  Indication:   Flank pain, kidney stone suspected     Technique:   CT abdomen/pelvis without IV contrast     Comparison:   None     Findings:   Lower thorax:   No acute findings. 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     Abdomen/pelvis:   The liver, and gallbladder and biliary system are unremarkable. Multiple calcified granulomas in the spleen, likely sequela of remote granulomatous disease. The pancreas is unremarkable. No adrenal nodules.     There is cortical atrophy of the bilateral kidneys. There is either moderate bilateral  hydronephrosis and/or multiple prominent parapelvic renal cysts, difficult to assess given lack of contrast-enhanced delayed phase imaging.  There is an obstructing 4 millimeter stone in the distal right ureter (series number 3, image 106), causing mild right-sided hydroureter. Two additional nonobstructing stones seen at the lower pole of the right kidney measuring up to 5 millimeters. There are no stones in the left kidney or ureter.     No suspicious bladder masses or lesions. Small bladder diverticulum along the posterior wall to the left of midline measuring approximately 1.4 centimeters.     Prostatomegaly with mass effect upon the bladder.     There is no evidence of bowel obstruction or inflammation. The appendix is not visualized; however, there are no secondary signs to suggest acute appendicitis. Colonic diverticulosis without CT evidence of acute diverticulitis.     No free fluid or free air. No abdominopelvic lymphadenopathy.     No abdominal aortic aneurysm. Mild-to-moderate calcific atherosclerosis of the aortoiliac system.     No acute fracture or malalignment. There is some degenerative changes of the spine with posterior spinal fusion construct spanning L4-L5. No suspicious osseous lesions.       Impression:   1. Obstructing 4 millimeter stone in the distal right ureter (series number 3, image 106), causing mild right-sided hydroureter.   2. There is either moderate bilateral hydronephrosis and/or multiple prominent parapelvic renal cysts, difficult to assess given lack of contrast-enhanced delayed phase imaging. Recommend correlation with patient history and prior imaging if available.   3. There are no stones in the left kidney or ureter.   4. Small bladder diverticulum.   5. Prostatomegaly with mass effect upon the bladder.       Please note that all CT scans at this facility use dose modulation, iterative reconstruction, and/or weight-based dosing when appropriate to reduce radiation dose to as  low as reasonably achievable.     Dictated by Esteban Rueda MD @ 8/10/2024 5:17:01 PM        ASSESSMENT and PLAN  83-year-old man with hx of gross hematuria with evidence of BPH and LUTS  New right distal ureteral stone (Right renal stones), urinary retention    Urinary retention  -- Continue Flomax  -- TOV with nurse in 5 days.     Ureteral stone  -- Continue tamsulosin  -- Strain urine  -- return precautions for ER reviewed (worsening pain, fever/chills).     Gross hematuria  - No further work-up is needed for his gross hematuria  -- No further gross hematuria    BPH with LUTS  - He has very minimal symptoms  - We reviewed options for tamsulosin if symptoms become irritating.       Follow up in 2-3 wks for symptom management, UA/PVR.       Time spent: 30 minutes spent on the date of the encounter doing chart review, history and exam, documentation and further activities as noted above.  This was in addition to cystoscopy time    Angela Engel PA-C  [facility]          Again, thank you for allowing me to participate in the care of your patient.      Sincerely,    Angela Engel PA-C, SAMUEL

## 2024-08-13 NOTE — PATIENT INSTRUCTIONS
Continue Flomax     Go to ER if severe pain, fevers or chills.     Robison removal in 4 days with nursing staff.    2-3 wk follow-up with me.

## 2024-08-16 ENCOUNTER — ALLIED HEALTH/NURSE VISIT (OUTPATIENT)
Dept: UROLOGY | Facility: CLINIC | Age: 84
End: 2024-08-16
Payer: COMMERCIAL

## 2024-08-16 DIAGNOSIS — N13.8 BPH WITH OBSTRUCTION/LOWER URINARY TRACT SYMPTOMS: Primary | ICD-10-CM

## 2024-08-16 DIAGNOSIS — N40.1 BPH WITH OBSTRUCTION/LOWER URINARY TRACT SYMPTOMS: Primary | ICD-10-CM

## 2024-08-16 DIAGNOSIS — R33.9 URINARY RETENTION: ICD-10-CM

## 2024-08-16 PROCEDURE — 51700 IRRIGATION OF BLADDER: CPT

## 2024-08-16 NOTE — NURSING NOTE
Pt here for TOV.    Pt states he was having a lot of bladder spasms.  Pt leaks thru his pants and under ware daily.    Approx 20 cc of  Normal Saline  instilled into the bladder via catheter.  Pt kept spasming and leaking out the saline.    I spoke to Angela Engel and she gave me the VO to remove cath.    Catheter then removed with out difficulty       Teaching done with patient verbally as to where to go or call  if unable to urinate post catheter removal.  Pt was told to go to the ED if he is unable to void over the weekend and call us on Monday.    Pt has follow-up on 8-29-24.    Pt was also informed to call us if needed.    Juan Higuera comes into clinic today at the request of Angela Engel Ordering Provider for TOV (trial of void).      This service provided today was under the supervising provider of the day Angela Egnel, who was available if needed.    LESLIE English CMA

## 2024-08-29 ENCOUNTER — OFFICE VISIT (OUTPATIENT)
Dept: UROLOGY | Facility: CLINIC | Age: 84
End: 2024-08-29
Payer: COMMERCIAL

## 2024-08-29 VITALS
DIASTOLIC BLOOD PRESSURE: 80 MMHG | WEIGHT: 194 LBS | HEIGHT: 66 IN | OXYGEN SATURATION: 97 % | BODY MASS INDEX: 31.18 KG/M2 | SYSTOLIC BLOOD PRESSURE: 138 MMHG | HEART RATE: 64 BPM

## 2024-08-29 DIAGNOSIS — N20.1 URETERAL STONE: Primary | ICD-10-CM

## 2024-08-29 DIAGNOSIS — R39.9 UTI SYMPTOMS: ICD-10-CM

## 2024-08-29 DIAGNOSIS — R33.9 URINARY RETENTION: ICD-10-CM

## 2024-08-29 LAB
ALBUMIN UR-MCNC: 30 MG/DL
APPEARANCE UR: CLEAR
BILIRUB UR QL STRIP: NEGATIVE
COLOR UR AUTO: YELLOW
GLUCOSE UR STRIP-MCNC: NEGATIVE MG/DL
HGB UR QL STRIP: ABNORMAL
KETONES UR STRIP-MCNC: NEGATIVE MG/DL
LEUKOCYTE ESTERASE UR QL STRIP: ABNORMAL
NITRATE UR QL: POSITIVE
PH UR STRIP: 6 [PH] (ref 5–7)
RESIDUAL VOLUME (RV) (EXTERNAL): NORMAL
SP GR UR STRIP: 1.02 (ref 1–1.03)
UROBILINOGEN UR STRIP-ACNC: 0.2 E.U./DL

## 2024-08-29 PROCEDURE — 81003 URINALYSIS AUTO W/O SCOPE: CPT | Mod: QW | Performed by: NURSE PRACTITIONER

## 2024-08-29 PROCEDURE — 99213 OFFICE O/P EST LOW 20 MIN: CPT | Mod: 25 | Performed by: NURSE PRACTITIONER

## 2024-08-29 PROCEDURE — 87086 URINE CULTURE/COLONY COUNT: CPT | Performed by: NURSE PRACTITIONER

## 2024-08-29 PROCEDURE — 51798 US URINE CAPACITY MEASURE: CPT | Performed by: NURSE PRACTITIONER

## 2024-08-29 RX ORDER — OXYCODONE HYDROCHLORIDE 5 MG/1
2.5 TABLET ORAL
COMMUNITY
Start: 2024-08-12

## 2024-08-29 RX ORDER — TAMSULOSIN HYDROCHLORIDE 0.4 MG/1
0.4 CAPSULE ORAL DAILY
Qty: 30 CAPSULE | Refills: 0 | Status: SHIPPED | OUTPATIENT
Start: 2024-08-29

## 2024-08-29 RX ORDER — CEPHALEXIN 500 MG/1
500 CAPSULE ORAL 2 TIMES DAILY
Qty: 20 CAPSULE | Refills: 0 | Status: SHIPPED | OUTPATIENT
Start: 2024-08-29 | End: 2024-09-12

## 2024-08-29 RX ORDER — AMOXICILLIN 875 MG
TABLET ORAL
COMMUNITY
Start: 2024-04-30

## 2024-08-29 RX ORDER — CEPHALEXIN 500 MG/1
500 CAPSULE ORAL 2 TIMES DAILY
Qty: 20 CAPSULE | Refills: 0 | Status: SHIPPED | OUTPATIENT
Start: 2024-08-29 | End: 2024-08-29

## 2024-08-29 ASSESSMENT — PAIN SCALES - GENERAL: PAINLEVEL: NO PAIN (0)

## 2024-08-29 NOTE — NURSING NOTE
Chief Complaint   Patient presents with    Urinary Retention     And stone follow up    Benign Prostatic Hypertrophy     And luts too    Pvr is 20ml done with bladder scan   Berny Herndon CMA   Doing well now   Patient thinks still have kidney stone dull pain rt lower abd   Urinating now doing well .

## 2024-08-29 NOTE — LETTER
8/29/2024       RE: Juan Higuera  [address] [facility] [address]     Dear Colleague,    Thank you for referring your patient, Juan Higuera, to the [facility] UROLOGY CL[facility]IC ALPHONSE at [facility]. Please see a copy of my visit note below.      Urology Outpatient Visit    Name: Juan Higuera    MRN: [id number]   YOB: 1940               Chief Complaint:   BPH, LUTS, and Nephrolithiasis         Impression and Plan:   Impression / Plan:   Juan Higuera is a 83 year old male with Obstructing 4 millimeter stone in the distal right ureter, BPH. PVR 20 mL today.     Given size and location of stone, and fact that symptoms are acceptably controlled, it is reasonable to continue with trial of medical expulsive therapy at this time.   - Continue tamsulosin 0.4 mg daily. Refilled   - Continue to push fluids. Strain all urine and submit any captured stones for analysis.  - Follow up in 10-14 days. If the stone passes prior to f/u appointment, pt will bring in for stone analysis.  - Warning signs discussed including fevers, chills, N/V, gross hematuria, severe pain that is refractory to medications which should prompt more urgent evaluation. Patient understands to proceed to the ER should these warning signs occur outside of clinic hours.    UA with small amount of leuk esterase and + nitrite, not having any UTI-like Sx. empirically prescribed course of Keflex. UC has now returned w mixture of urogenital rafi.     Follow-up 9/12 for reassessment.        Thank you for the opportunity to participate in the care of Juan Higuera.     LESLIE Oneal, CNP   [facility] - Department of Urology  [phone]          History of Present Illness:   Juan iHguera is a 83 year old male with Obstructing 4 millimeter stone in the distal right ureter identified in the Ed on 8/10 after presenting with 3 days of right flank pain. Hx BPH.     In the ED Patient unable to void more than about 10 cc. Bladder scan shows greater than 500, so Robison catheter was placed. He later has successful TOV and has low PVR of 20 today.     Started tamsulosin   No dysuria   No blood in urine   Pain under good control w aleve  Not straining his urine.    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)      Hernia, abdominal      History of shingles      [place]     bilat per ankur     Hypercholesterolemia      Hyperlipidaemia      Hypertension      Joint pain      Mumps      Pituitary adenoma (H)      Plantar fasciitis      Shingles      Sleep apnea     uses cpap     Spinal stenosis      Spinal stenosis      Spinal stenosis      Vitreous hemorrhage (H)           Past Surgical History:     Past Surgical History:   Procedure Laterality Date     APPENDECTOMY       BLEPHAROPLASTY BILATERAL Bilateral 07/17/2017    Procedure: BLEPHAROPLASTY BILATERAL;  BILATERAL UPPER LID BLEPHAROPLASTY;  Surgeon: Bryon Chandler MD;  Location:  [facility]     COLONOSCOPY       COLONOSCOPY N/A 10/01/2015    Procedure: COLONOSCOPY;  Surgeon: Walter Arreola MD;  Location:  GI     GENITOURINARY SURGERY      VASECTOMY     GRAFT BONE FROM ILIAC CREST Right 01/04/2016    Procedure: GRAFT BONE FROM ILIAC CREST;  Surgeon: Fly Jauregui MD;  Location:  OR     HYDROCELECTOMY SCROTAL  07/30/2013    Procedure: HYDROCELECTOMY SCROTAL;  BILATERAL HYDROCELECTOMY;  Surgeon: Esdras Cintron MD;  Location: [facility]     INCISION LIMBAL RELAXING, KERATOTOMY ARCUATE  09/16/2013    Procedure: INCISION LIMBAL RELAXING, KERATOTOMY ARCUATE;;  Surgeon: Bryon Chandler MD;  Location:  [facility]     INCISION LIMBAL RELAXING, KERATOTOMY ARCUATE  10/07/2013    Procedure: INCISION LIMBAL RELAXING, KERATOTOMY  ARCUATE;;  Surgeon: Bryon Chandler MD;  Location:  [facility]     LAMINECTOMY, FUSION LUMBAR ONE LEVEL, COMBINED N/A 2016    Procedure: COMBINED LAMINECTOMY, FUSION LUMBAR ONE LEVEL;  Surgeon: Fly Jauregui MD;  Location:  OR     LAPAROSCOPIC HERNIORRHAPHY INGUINAL Bilateral 2019    Procedure: LAPAROSCOPIC BILATERAL INGUINAL HERNIA REPAIR WITH MESH;  Surgeon: Hugh Bentley MD;  Location:  OR     PHACOEMULSIFICATION CLEAR CORNEA WITH STANDARD INTRAOCULAR LENS IMPLANT  2013    Procedure: PHACOEMULSIFICATION CLEAR CORNEA WITH STANDARD INTRAOCULAR LENS IMPLANT;  RIGHT EYE PHACOEMULSIFICATION CLEAR CORNEA WITH STANDARD INTRAOCULAR LENS IMPLANT AND LIMBAL RELAXING INCISION  ;  Surgeon: Bryon Chandler MD;  Location:  [facility]     PHACOEMULSIFICATION CLEAR CORNEA WITH STANDARD INTRAOCULAR LENS IMPLANT  10/07/2013    Procedure: PHACOEMULSIFICATION CLEAR CORNEA WITH STANDARD INTRAOCULAR LENS IMPLANT;  LEFT EYE PHACOEMULSIFICATION CLEAR CORNEA WITH STANDARD INTRAOCULAR LENS IMPLANT AND LIMBAL RELAXING INCISION ;  Surgeon: Bryon Chandler MD;  Location:  [facility]     TESTICLE SURGERY       VASECTOMY            Social History:     Social History     Tobacco Use     Smoking status: Former     Current packs/day: 0.00     Types: Cigarettes     Quit date: 1982     Years since quittin.6     Smokeless tobacco: Never   Substance Use Topics     Alcohol use: Yes     Comment: 2 per week          Family History:     Family History   Problem Relation Age of Onset     Hypertension Mother      Hypertension Father      Cerebrovascular Disease Father           Allergies:     Allergies   Allergen Reactions     Influenza Vaccine Live      HX OF GUILLAN BARRE     Peanut (Diagnostic)      Other reaction(s): Angioedema     Peanuts [Nuts] Swelling     Shellfish Allergy Other (See Comments)     Shrimp      Tongue and mouth swelling          Medications:     Current Outpatient Medications   Medication Sig  Dispense Refill     Acetaminophen (TYLENOL PO)        Acetaminophen (TYLENOL PO) Take 1,000 mg by mouth 2 times daily Morning and Noon       amLODIPine (NORVASC) 5 MG tablet Take 1 tablet (5 mg) by mouth daily 90 tablet 3     amoxicillin (AMOXIL) 875 MG tablet        Ascorbic Acid (VITAMIN C PO) Take 1,000 mg by mouth daily        aspirin 81 MG chewable tablet Take 81 mg by mouth daily.       cabergoline (DOSTINEX) 0.5 MG tablet Take 0.25 mg by mouth twice a week       erythromycin (ROMYCIN) ophthalmic ointment Apply to operative eyelid(s), in eye(s) at bedtime 1 g 1     labetalol (NORMODYNE) 200 MG tablet Take 1 tablet (200 mg) by mouth 2 times daily 1 tablet 0     levothyroxine (SYNTHROID, LEVOTHROID) 12.5 MCG TABS Take 37 mcg by mouth every morning (before breakfast)        lisinopril-hydrochlorothiazide (PRINZIDE,ZESTORETIC) 20-25 MG per tablet Take 1 tablet by mouth daily 1 tabs daily 180 tablet 3     Magnesium Oxide 140 MG CAPS        MINOXIDIL PO Take 5 mg by mouth daily (takes 0.5 x 10mg tablet)       naproxen sodium (ANAPROX) 220 MG tablet        naproxen sodium (ANAPROX) 220 MG tablet Take 440 mg by mouth daily        oxyCODONE (ROXICODONE) 5 MG tablet Take 2.5 mg by mouth.       pantoprazole (PROTONIX) 40 MG [facility] tablet Take 40 mg by mouth daily       spironolactone (ALDACTONE) 25 MG tablet Take 25 mg by mouth daily       tamsulosin (FLOMAX) 0.4 MG capsule Take 0.4 mg by mouth daily       UNKNOWN TO PATIENT Apply topically daily as needed (Eczema) Cream: medication name unknown       VITAMIN D, CHOLECALCIFEROL, PO Take 5,000 Units by mouth daily        zinc gluconate 50 MG tablet        No current facility-administered medications for this visit.          Review of Systems:    ROS: See HPI for pertinent details.  Remainder of 10-point ROS negative.         Physical Exam:   VS:  T: Data Unavailable    HR: 64    BP: 138/80    RR: Data Unavailable     GEN:  Alert.  NAD.   HEENT:  Sclerae anicteric.    CV:   "No obvious jugular venous distension.  LUNGS: No respiratory distress, breathing comfortably wo accessory muscle use.  ABD:  ND.     SKIN:  Dry. No visible rashes.   NEURO:  CN grossly intact.          Laboratory Data:   No results found for: \"PSA\"  Lab Results   Component Value Date    CR 1.3 11/29/2022    CR 1.26 07/05/2016    CR 1.09 01/04/2016    CR 1.06 01/07/2015    CR 1.03 11/25/2014    CR 1.05 11/18/2014    CR 0.95 01/17/2012     Lab Results   Component Value Date    GFRESTIMATED 55 11/29/2022    GFRESTIMATED 66 01/04/2016    GFRESTIMATED 72 01/07/2015    GFRESTIMATED 75 11/25/2014    GFRESTIMATED 73 11/18/2014    GFRESTIMATED 78 01/17/2012            Again, thank you for allowing me to participate in the care of your patient.      Sincerely,    Sultana Rosas, LESLIE CNP    "

## 2024-08-29 NOTE — PROGRESS NOTES
Urology Outpatient Visit    Name: Juan Higuera    MRN: [id number]   YOB: 1940               Chief Complaint:   BPH, LUTS, and Nephrolithiasis         Impression and Plan:   Impression / Plan:   Juan Higuera is a 83 year old male with Obstructing 4 millimeter stone in the distal right ureter, BPH. PVR 20 mL today.     Given size and location of stone, and fact that symptoms are acceptably controlled, it is reasonable to continue with trial of medical expulsive therapy at this time.   - Continue tamsulosin 0.4 mg daily. Refilled   - Continue to push fluids. Strain all urine and submit any captured stones for analysis.  - Follow up in 10-14 days. If the stone passes prior to f/u appointment, pt will bring in for stone analysis.  - Warning signs discussed including fevers, chills, N/V, gross hematuria, severe pain that is refractory to medications which should prompt more urgent evaluation. Patient understands to proceed to the ER should these warning signs occur outside of clinic hours.    UA with small amount of leuk esterase and + nitrite, not having any UTI-like Sx. empirically prescribed course of Keflex. UC has now returned w mixture of urogenital rafi.     Follow-up 9/12 for reassessment.        Thank you for the opportunity to participate in the care of Juan Higuera.     LESLIE Oneal, CNP  [facility] - Department of Urology  [phone]          History of Present Illness:   Juan Higuera is a 83 year old male with Obstructing 4 millimeter stone in the distal right ureter identified in the Ed on 8/10 after presenting with 3 days of right flank pain. Hx BPH.    In the ED Patient unable to void more than about 10 cc. Bladder scan shows greater than 500, so Robison catheter was placed. He later has successful TOV and has low PVR of 20 today.     Started tamsulosin   No dysuria   No blood in urine   Pain under good control w aleve  Not straining his urine.    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)     Hernia, abdominal     History of shingles     Hydrocoele     bilat per ankur    Hypercholesterolemia     Hyperlipidaemia     Hypertension     Joint pain     Mumps     Pituitary adenoma (H)     Plantar fasciitis     Shingles     Sleep apnea     uses cpap    Spinal stenosis     Spinal stenosis     Spinal stenosis     Vitreous hemorrhage (H)           Past Surgical History:     Past Surgical History:   Procedure Laterality Date    APPENDECTOMY      BLEPHAROPLASTY BILATERAL Bilateral 07/17/2017    Procedure: BLEPHAROPLASTY BILATERAL;  BILATERAL UPPER LID BLEPHAROPLASTY;  Surgeon: Bryon Chandler MD;  Location:  [facility]    COLONOSCOPY      COLONOSCOPY N/A 10/01/2015    Procedure: COLONOSCOPY;  Surgeon: Walter Arreola MD;  Location:  GI    GENITOURINARY SURGERY      VASECTOMY    GRAFT BONE FROM ILIAC CREST Right 01/04/2016    Procedure: GRAFT BONE FROM ILIAC CREST;  Surgeon: Fly Jauregui MD;  Location:  OR    HYDROCELECTOMY SCROTAL  07/30/2013    Procedure: HYDROCELECTOMY SCROTAL;  BILATERAL HYDROCELECTOMY;  Surgeon: Esdras Cintron MD;  Location: [facility]    INCISION LIMBAL RELAXING, KERATOTOMY ARCUATE  09/16/2013    Procedure: INCISION LIMBAL RELAXING, KERATOTOMY ARCUATE;;  Surgeon: Bryon Chandler MD;  Location:  [facility]    INCISION LIMBAL RELAXING, KERATOTOMY ARCUATE  10/07/2013    Procedure: INCISION LIMBAL RELAXING, KERATOTOMY ARCUATE;;  Surgeon: Bryon Chandler MD;  Location:  [facility]    LAMINECTOMY, FUSION LUMBAR ONE LEVEL, COMBINED N/A 01/04/2016    Procedure: COMBINED LAMINECTOMY, FUSION LUMBAR ONE LEVEL;  Surgeon: Fly Jauregui MD;  Location:  OR    LAPAROSCOPIC HERNIORRHAPHY INGUINAL Bilateral 11/13/2019    Procedure: LAPAROSCOPIC BILATERAL INGUINAL HERNIA  REPAIR WITH MESH;  Surgeon: Hugh Bentley MD;  Location:  OR    PHACOEMULSIFICATION CLEAR CORNEA WITH STANDARD INTRAOCULAR LENS IMPLANT  2013    Procedure: PHACOEMULSIFICATION CLEAR CORNEA WITH STANDARD INTRAOCULAR LENS IMPLANT;  RIGHT EYE PHACOEMULSIFICATION CLEAR CORNEA WITH STANDARD INTRAOCULAR LENS IMPLANT AND LIMBAL RELAXING INCISION  ;  Surgeon: Bryon Chandler MD;  Location:  [facility]    PHACOEMULSIFICATION CLEAR CORNEA WITH STANDARD INTRAOCULAR LENS IMPLANT  10/07/2013    Procedure: PHACOEMULSIFICATION CLEAR CORNEA WITH STANDARD INTRAOCULAR LENS IMPLANT;  LEFT EYE PHACOEMULSIFICATION CLEAR CORNEA WITH STANDARD INTRAOCULAR LENS IMPLANT AND LIMBAL RELAXING INCISION ;  Surgeon: Bryon Chandler MD;  Location:  [facility]    TESTICLE SURGERY      VASECTOMY            Social History:     Social History     Tobacco Use    Smoking status: Former     Current packs/day: 0.00     Types: Cigarettes     Quit date: 1982     Years since quittin.6    Smokeless tobacco: Never   Substance Use Topics    Alcohol use: Yes     Comment: 2 per week          Family History:     Family History   Problem Relation Age of Onset    Hypertension Mother     Hypertension Father     Cerebrovascular Disease Father           Allergies:     Allergies   Allergen Reactions    Influenza Vaccine Live      HX OF GUILLAN BARRE    Peanut (Diagnostic)      Other reaction(s): Angioedema    Peanuts [Nuts] Swelling    Shellfish Allergy Other (See Comments)    Shrimp      Tongue and mouth swelling          Medications:     Current Outpatient Medications   Medication Sig Dispense Refill    Acetaminophen (TYLENOL PO)       Acetaminophen (TYLENOL PO) Take 1,000 mg by mouth 2 times daily Morning and Noon      amLODIPine (NORVASC) 5 MG tablet Take 1 tablet (5 mg) by mouth daily 90 tablet 3    amoxicillin (AMOXIL) 875 MG tablet       Ascorbic Acid (VITAMIN C PO) Take 1,000 mg by mouth daily       aspirin 81 MG chewable tablet Take 81 mg by  "mouth daily.      cabergoline (DOSTINEX) 0.5 MG tablet Take 0.25 mg by mouth twice a week      erythromycin (ROMYCIN) ophthalmic ointment Apply to operative eyelid(s), in eye(s) at bedtime 1 g 1    labetalol (NORMODYNE) 200 MG tablet Take 1 tablet (200 mg) by mouth 2 times daily 1 tablet 0    levothyroxine (SYNTHROID, LEVOTHROID) 12.5 MCG TABS Take 37 mcg by mouth every morning (before breakfast)       lisinopril-hydrochlorothiazide (PRINZIDE,ZESTORETIC) 20-25 MG per tablet Take 1 tablet by mouth daily 1 tabs daily 180 tablet 3    Magnesium Oxide 140 MG CAPS       MINOXIDIL PO Take 5 mg by mouth daily (takes 0.5 x 10mg tablet)      naproxen sodium (ANAPROX) 220 MG tablet       naproxen sodium (ANAPROX) 220 MG tablet Take 440 mg by mouth daily       oxyCODONE (ROXICODONE) 5 MG tablet Take 2.5 mg by mouth.      pantoprazole (PROTONIX) 40 MG EC tablet Take 40 mg by mouth daily      spironolactone (ALDACTONE) 25 MG tablet Take 25 mg by mouth daily      tamsulosin (FLOMAX) 0.4 MG capsule Take 0.4 mg by mouth daily      UNKNOWN TO PATIENT Apply topically daily as needed (Eczema) Cream: medication name unknown      VITAMIN D, CHOLECALCIFEROL, PO Take 5,000 Units by mouth daily       zinc gluconate 50 MG tablet        No current facility-administered medications for this visit.          Review of Systems:    ROS: See HPI for pertinent details.  Remainder of 10-point ROS negative.         Physical Exam:   VS:  T: Data Unavailable    HR: 64    BP: 138/80    RR: Data Unavailable     GEN:  Alert.  NAD.   HEENT:  Sclerae anicteric.    CV:  No obvious jugular venous distension.  LUNGS: No respiratory distress, breathing comfortably wo accessory muscle use.  ABD:  ND.     SKIN:  Dry. No visible rashes.   NEURO:  CN grossly intact.          Laboratory Data:   No results found for: \"PSA\"  Lab Results   Component Value Date    CR 1.3 11/29/2022    CR 1.26 07/05/2016    CR 1.09 01/04/2016    CR 1.06 01/07/2015    CR 1.03 11/25/2014 "    CR 1.05 11/18/2014    CR 0.95 01/17/2012     Lab Results   Component Value Date    GFRESTIMATED 55 11/29/2022    GFRESTIMATED 66 01/04/2016    GFRESTIMATED 72 01/07/2015    GFRESTIMATED 75 11/25/2014    GFRESTIMATED 73 11/18/2014    GFRESTIMATED 78 01/17/2012

## 2024-08-30 LAB — BACTERIA UR CULT: NORMAL

## 2024-09-26 ENCOUNTER — VIRTUAL VISIT (OUTPATIENT)
Dept: UROLOGY | Facility: CLINIC | Age: 84
End: 2024-09-26
Payer: COMMERCIAL

## 2024-09-26 VITALS — HEIGHT: 67 IN | WEIGHT: 187.3 LBS | BODY MASS INDEX: 29.4 KG/M2

## 2024-09-26 DIAGNOSIS — N28.1 KIDNEY CYSTS: ICD-10-CM

## 2024-09-26 DIAGNOSIS — N40.1 BENIGN PROSTATIC HYPERPLASIA WITH LOWER URINARY TRACT SYMPTOMS, SYMPTOM DETAILS UNSPECIFIED: Primary | ICD-10-CM

## 2024-09-26 DIAGNOSIS — Z87.442 HISTORY OF NEPHROLITHIASIS: ICD-10-CM

## 2024-09-26 PROCEDURE — 99213 OFFICE O/P EST LOW 20 MIN: CPT | Mod: 95 | Performed by: NURSE PRACTITIONER

## 2024-09-26 RX ORDER — TAMSULOSIN HYDROCHLORIDE 0.4 MG/1
0.4 CAPSULE ORAL DAILY
Qty: 90 CAPSULE | Refills: 4 | Status: SHIPPED | OUTPATIENT
Start: 2024-09-26

## 2024-09-26 ASSESSMENT — PAIN SCALES - GENERAL: PAINLEVEL: NO PAIN (0)

## 2024-09-26 NOTE — PROGRESS NOTES
Virtual Visit Details  Type of service:  Video Visit   Originating Location (pt. Location): Home  Distant Location (provider location):  On-site  Platform used for Video Visit: [facility]      Urology Virtual Visit    Name: Juan Higuera    MRN: [id number]   YOB: 1940               Chief Complaint:   BPH and Ureterolithiasis         Impression and Plan:   Impression / Plan:   Juan Higuera is a 83 year old male with:  Hx of BPH - continues on Flomax  Obstructing 4 millimeter stone in the distal right ureter - uncertain passage  Moderate bilateral hydronephrosis and/or multiple prominent parapelvic renal cysts, difficult to assess given lack of contrast-enhanced delayed phase imaging      -Plan to obtain CT Urogram to assess stone passage and further investigate presence of hydro/cysts.  -Will continue Flomax for LUTS / MET given uncertain passage of stone. Refilled.    Thank you for the opportunity to participate in the care of Juan Higuera.     LESLIE Oneal, CNP  [facility] - Department of Urology  [phone]          History of Present Illness:   Juan Higuera is a 83 year old male with Hx of BPH here for follow-up on  Obstructing 4 millimeter stone in the distal right ureter identified in the ED on 8/10. I originally saw him on 8/29 and it was decided to continue w MET at that time. He is still having some discomfort in lower abd. Has not taken and pain meds in past two weeks. No blood in the urine. Not straining his urine. Continues on Flomax, helping w his baselines LUTS. On his recent CT in August, there is either moderate bilateral hydronephrosis and/or multiple prominent parapelvic renal cysts, difficult to assess given lack of contrast-enhanced delayed phase imaging.    History is obtained from patient & EMR    Pertinent imaging reviewed:  CT ABDOMEN PELVIS STONE PROTOCOL WO 8/10/24  Impression:   1. Obstructing 4 millimeter stone in the distal right ureter (series  number 3, image 106), causing mild right-sided hydroureter.   2. There is either moderate bilateral hydronephrosis and/or multiple prominent parapelvic renal cysts, difficult to assess given lack of contrast-enhanced delayed phase imaging. Recommend correlation with patient history and prior imaging if available.   3. There are no stones in the left kidney or ureter.   4. Small bladder diverticulum.   5. Prostatomegaly with mass effect upon the bladder.           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)     Hernia, abdominal     History of shingles     Hydrocoele     bilat per ankur    Hypercholesterolemia     Hyperlipidaemia     Hypertension     Joint pain     Mumps     Pituitary adenoma (H)     Plantar fasciitis     Shingles     Sleep apnea     uses cpap    Spinal stenosis     Spinal stenosis     Spinal stenosis     Vitreous hemorrhage (H)           Past Surgical History:     Past Surgical History:   Procedure Laterality Date    APPENDECTOMY      BLEPHAROPLASTY BILATERAL Bilateral 07/17/2017    Procedure: BLEPHAROPLASTY BILATERAL;  BILATERAL UPPER LID BLEPHAROPLASTY;  Surgeon: Bryon Chandler MD;  Location: [facility]    COLONOSCOPY      COLONOSCOPY N/A 10/01/2015    Procedure: COLONOSCOPY;  Surgeon: Walter Arreola MD;  Location:  GI    GENITOURINARY SURGERY      VASECTOMY    GRAFT BONE FROM ILIAC CREST Right 01/04/2016    Procedure: GRAFT BONE FROM ILIAC CREST;  Surgeon: Fly Jauregui MD;  Location:  OR    HYDROCELECTOMY SCROTAL  07/30/2013    Procedure: HYDROCELECTOMY SCROTAL;  BILATERAL HYDROCELECTOMY;  Surgeon: Esdras Cintron MD;  Location: [facility]    INCISION LIMBAL RELAXING, KERATOTOMY ARCUATE  09/16/2013    Procedure: INCISION LIMBAL RELAXING, KERATOTOMY ARCUATE;;  Surgeon: Bryon Chandler MD;  Location:  [facility]    INCISION LIMBAL RELAXING,  KERATOTOMY ARCUATE  10/07/2013    Procedure: INCISION LIMBAL RELAXING, KERATOTOMY ARCUATE;;  Surgeon: Bryon Chandler MD;  Location:  [facility]    LAMINECTOMY, FUSION LUMBAR ONE LEVEL, COMBINED N/A 2016    Procedure: COMBINED LAMINECTOMY, FUSION LUMBAR ONE LEVEL;  Surgeon: Fly Jauregui MD;  Location:  OR    LAPAROSCOPIC HERNIORRHAPHY INGUINAL Bilateral 2019    Procedure: LAPAROSCOPIC BILATERAL INGUINAL HERNIA REPAIR WITH MESH;  Surgeon: Hugh Bentley MD;  Location:  OR    PHACOEMULSIFICATION CLEAR CORNEA WITH STANDARD INTRAOCULAR LENS IMPLANT  2013    Procedure: PHACOEMULSIFICATION CLEAR CORNEA WITH STANDARD INTRAOCULAR LENS IMPLANT;  RIGHT EYE PHACOEMULSIFICATION CLEAR CORNEA WITH STANDARD INTRAOCULAR LENS IMPLANT AND LIMBAL RELAXING INCISION  ;  Surgeon: Bryon Chandler MD;  Location:  [facility]    PHACOEMULSIFICATION CLEAR CORNEA WITH STANDARD INTRAOCULAR LENS IMPLANT  10/07/2013    Procedure: PHACOEMULSIFICATION CLEAR CORNEA WITH STANDARD INTRAOCULAR LENS IMPLANT;  LEFT EYE PHACOEMULSIFICATION CLEAR CORNEA WITH STANDARD INTRAOCULAR LENS IMPLANT AND LIMBAL RELAXING INCISION ;  Surgeon: Bryon Chandler MD;  Location:  [facility]    TESTICLE SURGERY      VASECTOMY            Social History:     Social History     Tobacco Use    Smoking status: Former     Current packs/day: 0.00     Types: Cigarettes     Quit date: 1982     Years since quittin.7    Smokeless tobacco: Never   Substance Use Topics    Alcohol use: Yes     Comment: 2 per week          Family History:     Family History   Problem Relation Age of Onset    Hypertension Mother     Hypertension Father     Cerebrovascular Disease Father           Allergies:     Allergies   Allergen Reactions    Influenza Vaccine Live      HX OF GUILLAN BARRE    Peanut (Diagnostic)      Other reaction(s): Angioedema    Peanuts [Nuts] Swelling    Shellfish Allergy Other (See Comments)    Shrimp      Tongue and mouth swelling           Medications:     Current Outpatient Medications   Medication Sig Dispense Refill    Acetaminophen (TYLENOL PO)       Acetaminophen (TYLENOL PO) Take 1,000 mg by mouth 2 times daily Morning and Noon      amLODIPine (NORVASC) 5 MG tablet Take 1 tablet (5 mg) by mouth daily 90 tablet 3    amoxicillin (AMOXIL) 875 MG tablet       Ascorbic Acid (VITAMIN C PO) Take 1,000 mg by mouth daily       aspirin 81 MG chewable tablet Take 81 mg by mouth daily.      cabergoline (DOSTINEX) 0.5 MG tablet Take 0.25 mg by mouth twice a week      erythromycin (ROMYCIN) ophthalmic ointment Apply to operative eyelid(s), in eye(s) at bedtime 1 g 1    labetalol (NORMODYNE) 200 MG tablet Take 1 tablet (200 mg) by mouth 2 times daily 1 tablet 0    levothyroxine (SYNTHROID, LEVOTHROID) 12.5 MCG TABS Take 37 mcg by mouth every morning (before breakfast)       lisinopril-hydrochlorothiazide (PRINZIDE,ZESTORETIC) 20-25 MG per tablet Take 1 tablet by mouth daily 1 tabs daily 180 tablet 3    Magnesium Oxide 140 MG CAPS       MINOXIDIL PO Take 5 mg by mouth daily (takes 0.5 x 10mg tablet)      naproxen sodium (ANAPROX) 220 MG tablet       naproxen sodium (ANAPROX) 220 MG tablet Take 440 mg by mouth daily       oxyCODONE (ROXICODONE) 5 MG tablet Take 2.5 mg by mouth.      pantoprazole (PROTONIX) 40 MG EC tablet Take 40 mg by mouth daily      spironolactone (ALDACTONE) 25 MG tablet Take 25 mg by mouth daily      tamsulosin (FLOMAX) 0.4 MG capsule Take 1 capsule (0.4 mg) by mouth daily. 30 capsule 0    tamsulosin (FLOMAX) 0.4 MG capsule Take 0.4 mg by mouth daily      UNKNOWN TO PATIENT Apply topically daily as needed (Eczema) Cream: medication name unknown      VITAMIN D, CHOLECALCIFEROL, PO Take 5,000 Units by mouth daily       zinc gluconate 50 MG tablet        No current facility-administered medications for this visit.          Review of Systems:    ROS: See HPI for pertinent details.  Remainder of 10-point ROS negative.         Physical Exam:  "  VS:  T: Data Unavailable    HR: Data Unavailable    BP: Data Unavailable    RR: Data Unavailable     GEN:  Alert.  NAD.    HEENT:  Sclerae anicteric.    CV:  No obvious jugular venous distension  LUNGS: No respiratory distress, breathing comfortably wo accessory muscle use.  SKIN:  No visible rash  NEURO:  CN grossly intact.          Laboratory Data:   No results found for: \"PSA\"  Lab Results   Component Value Date    CR 1.3 11/29/2022    CR 1.26 07/05/2016    CR 1.09 01/04/2016    CR 1.06 01/07/2015    CR 1.03 11/25/2014    CR 1.05 11/18/2014     Lab Results   Component Value Date    GFRESTIMATED 55 11/29/2022    GFRESTIMATED 66 01/04/2016    GFRESTIMATED 72 01/07/2015    GFRESTIMATED 75 11/25/2014    GFRESTIMATED 73 11/18/2014    GFRESTIMATED 78 01/17/2012          "

## 2024-09-26 NOTE — NURSING NOTE
Current patient location: [address] [place] [id number]    Is the patient currently in the state of [place]? YES    Visit mode:VIDEO    If the visit is dropped, the patient can be reconnected by: VIDEO VISIT: Text to cell phone:   Telephone Information:   Mobile [phone]       Will anyone else be joining the visit? NO  (If patient encounters technical issues they should call [phone])    How would you like to obtain your AVS? MyChart    Are changes needed to the allergy or medication list? No    Are refills needed on medications prescribed by this physician? NO    Rooming Documentation:  Not applicable    Reason for visit: RECHECK    Raya ALCANTARA

## 2024-09-26 NOTE — PROGRESS NOTES
"Virtual Visit Details    Type of service:  Video Visit   Video Start Time: {video visit start/end time for provider to select:553005}  Video End Time:{video visit start/end time for provider to select:480059}    Originating Location (pt. Location): {video visit patient location:233476::\"Home\"}  {PROVIDER LOCATION On-site should be selected for visits conducted from your clinic location or adjoining [facility] hospital, academic office, or other nearby [facility] building. Off-site should be selected for all other provider locations, including home:460459}  Distant Location (provider location):  {virtual location provider:680272}  Platform used for Video Visit: {Virtual Visit Platforms:001139::\"[employer]\"}  "

## 2024-09-26 NOTE — LETTER
9/26/2024       RE: Juan Higuera  [address]  [place] [address]     Dear Colleague,    Thank you for referring your patient, Juan Higuera, to the [facility] at [facility]. Please see a copy of my visit note below.    Virtual Visit Details  Type of service:  Video Visit   Originating Location (pt. Location): Home  Distant Location (provider location):  On-site  Platform used for Video Visit: [facility]      Urology Virtual Visit    Name: Juan Higuera    MRN: [id number]   YOB: 1940               Chief Complaint:   BPH and Ureterolithiasis         Impression and Plan:   Impression / Plan:   Juan Higuera is a 83 year old male with:  Hx of BPH - continues on Flomax  Obstructing 4 millimeter stone in the distal right ureter - uncertain passage  Moderate bilateral hydronephrosis and/or multiple prominent parapelvic renal cysts, difficult to assess given lack of contrast-enhanced delayed phase imaging      -Plan to obtain CT Urogram to assess stone passage and further investigate presence of hydro/cysts.  -Will continue Flomax for LUTS / MET given uncertain passage of stone. Refilled.    Thank you for the opportunity to participate in the care of Juan Higuera.     LESLIE Oneal, CNP   [facility] - Department of Urology  [phone]          History of Present Illness:   Juan Higuera is a 83 year old male with Hx of BPH here for follow-up on  Obstructing 4 millimeter stone in the distal right ureter identified in the ED on 8/10. I originally saw him on 8/29 and it was decided to continue w MET at that time. He is still having some discomfort in lower abd. Has not taken and pain meds in past two weeks. No blood in the urine. Not straining his urine. Continues on Flomax, helping w his baselines LUTS. On his recent CT in August, there is either moderate bilateral hydronephrosis and/or multiple  prominent parapelvic renal cysts, difficult to assess given lack of contrast-enhanced delayed phase imaging.    History is obtained from patient & EMR    Pertinent imaging reviewed:  CT ABDOMEN PELVIS STONE PROTOCOL WO 8/10/24  Impression:   1. Obstructing 4 millimeter stone in the distal right ureter (series number 3, image 106), causing mild right-sided hydroureter.   2. There is either moderate bilateral hydronephrosis and/or multiple prominent parapelvic renal cysts, difficult to assess given lack of contrast-enhanced delayed phase imaging. Recommend correlation with patient history and prior imaging if available.   3. There are no stones in the left kidney or ureter.   4. Small bladder diverticulum.   5. Prostatomegaly with mass effect upon the bladder.           Past Medical History:     Past Medical History:   Diagnosis Date     Arthritis      BPH (benign prostatic hyperplasia)      Colon polyps      Edema      Facial swelling      Guillain-Ford (H24)      Hernia, abdominal      History of shingles      [place]     bilat per ankur     Hypercholesterolemia      Hyperlipidaemia      Hypertension      Joint pain      Mumps      Pituitary adenoma (H)      Plantar fasciitis      Shingles      Sleep apnea     uses cpap     Spinal stenosis      Spinal stenosis      Spinal stenosis      Vitreous hemorrhage (H)           Past Surgical History:     Past Surgical History:   Procedure Laterality Date     APPENDECTOMY       BLEPHAROPLASTY BILATERAL Bilateral 07/17/2017    Procedure: BLEPHAROPLASTY BILATERAL;  BILATERAL UPPER LID BLEPHAROPLASTY;  Surgeon: Bryon Chandler MD;  Location:  [facility]     COLONOSCOPY       COLONOSCOPY N/A 10/01/2015    Procedure: COLONOSCOPY;  Surgeon: Walter Arreola MD;  Location:  GI     GENITOURINARY SURGERY      VASECTOMY     GRAFT BONE FROM ILIAC CREST Right 01/04/2016    Procedure: GRAFT BONE FROM ILIAC CREST;  Surgeon: Fly Jauregui MD;  Location:  OR     HYDROCELECTOMY  SCROTAL  2013    Procedure: HYDROCELECTOMY SCROTAL;  BILATERAL HYDROCELECTOMY;  Surgeon: Esdras Cintron MD;  Location:  [facility]     INCISION LIMBAL RELAXING, KERATOTOMY ARCUATE  2013    Procedure: INCISION LIMBAL RELAXING, KERATOTOMY ARCUATE;;  Surgeon: Bryon Chandler MD;  Location:  [facility]     INCISION LIMBAL RELAXING, KERATOTOMY ARCUATE  10/07/2013    Procedure: INCISION LIMBAL RELAXING, KERATOTOMY ARCUATE;;  Surgeon: Bryon Chandler MD;  Location:  [facility]     LAMINECTOMY, FUSION LUMBAR ONE LEVEL, COMBINED N/A 2016    Procedure: COMBINED LAMINECTOMY, FUSION LUMBAR ONE LEVEL;  Surgeon: Fly Jauregui MD;  Location:  OR     LAPAROSCOPIC HERNIORRHAPHY INGUINAL Bilateral 2019    Procedure: LAPAROSCOPIC BILATERAL INGUINAL HERNIA REPAIR WITH MESH;  Surgeon: Hugh Bentley MD;  Location:  OR     PHACOEMULSIFICATION CLEAR CORNEA WITH STANDARD INTRAOCULAR LENS IMPLANT  2013    Procedure: PHACOEMULSIFICATION CLEAR CORNEA WITH STANDARD INTRAOCULAR LENS IMPLANT;  RIGHT EYE PHACOEMULSIFICATION CLEAR CORNEA WITH STANDARD INTRAOCULAR LENS IMPLANT AND LIMBAL RELAXING INCISION  ;  Surgeon: Bryon Chandler MD;  Location:  [facility]     PHACOEMULSIFICATION CLEAR CORNEA WITH STANDARD INTRAOCULAR LENS IMPLANT  10/07/2013    Procedure: PHACOEMULSIFICATION CLEAR CORNEA WITH STANDARD INTRAOCULAR LENS IMPLANT;  LEFT EYE PHACOEMULSIFICATION CLEAR CORNEA WITH STANDARD INTRAOCULAR LENS IMPLANT AND LIMBAL RELAXING INCISION ;  Surgeon: Bryon Chandler MD;  Location: [facility]     TESTICLE SURGERY       VASECTOMY            Social History:     Social History     Tobacco Use     Smoking status: Former     Current packs/day: 0.00     Types: Cigarettes     Quit date: 1982     Years since quittin.7     Smokeless tobacco: Never   Substance Use Topics     Alcohol use: Yes     Comment: 2 per week          Family History:     Family History   Problem Relation Age of Onset     Hypertension  Mother      Hypertension Father      Cerebrovascular Disease Father           Allergies:     Allergies   Allergen Reactions     Influenza Vaccine Live      HX OF GUILLAN BARRE     Peanut (Diagnostic)      Other reaction(s): Angioedema     Peanuts [Nuts] Swelling     Shellfish Allergy Other (See Comments)     Shrimp      Tongue and mouth swelling          Medications:     Current Outpatient Medications   Medication Sig Dispense Refill     Acetaminophen (TYLENOL PO)        Acetaminophen (TYLENOL PO) Take 1,000 mg by mouth 2 times daily Morning and Noon       amLODIPine (NORVASC) 5 MG tablet Take 1 tablet (5 mg) by mouth daily 90 tablet 3     amoxicillin (AMOXIL) 875 MG tablet        Ascorbic Acid (VITAMIN C PO) Take 1,000 mg by mouth daily        aspirin 81 MG chewable tablet Take 81 mg by mouth daily.       cabergoline (DOSTINEX) 0.5 MG tablet Take 0.25 mg by mouth twice a week       erythromycin (ROMYCIN) ophthalmic ointment Apply to operative eyelid(s), in eye(s) at bedtime 1 g 1     labetalol (NORMODYNE) 200 MG tablet Take 1 tablet (200 mg) by mouth 2 times daily 1 tablet 0     levothyroxine (SYNTHROID, LEVOTHROID) 12.5 MCG TABS Take 37 mcg by mouth every morning (before breakfast)        lisinopril-hydrochlorothiazide (PRINZIDE,ZESTORETIC) 20-25 MG per tablet Take 1 tablet by mouth daily 1 tabs daily 180 tablet 3     Magnesium Oxide 140 MG CAPS        MINOXIDIL PO Take 5 mg by mouth daily (takes 0.5 x 10mg tablet)       naproxen sodium (ANAPROX) 220 MG tablet        naproxen sodium (ANAPROX) 220 MG tablet Take 440 mg by mouth daily        oxyCODONE (ROXICODONE) 5 MG tablet Take 2.5 mg by mouth.       pantoprazole (PROTONIX) 40 MG EC tablet Take 40 mg by mouth daily       spironolactone (ALDACTONE) 25 MG tablet Take 25 mg by mouth daily       tamsulosin (FLOMAX) 0.4 MG capsule Take 1 capsule (0.4 mg) by mouth daily. 30 capsule 0     tamsulosin (FLOMAX) 0.4 MG capsule Take 0.4 mg by mouth daily       UNKNOWN TO  "PATIENT Apply topically daily as needed (Eczema) Cream: medication name unknown       VITAMIN D, CHOLECALCIFEROL, PO Take 5,000 Units by mouth daily        zinc gluconate 50 MG tablet        No current facility-administered medications for this visit.          Review of Systems:    ROS: See HPI for pertinent details.  Remainder of 10-point ROS negative.         Physical Exam:   VS:  T: Data Unavailable    HR: Data Unavailable    BP: Data Unavailable    RR: Data Unavailable     GEN:  Alert.  NAD.    HEENT:  Sclerae anicteric.    CV:  No obvious jugular venous distension  LUNGS: No respiratory distress, breathing comfortably wo accessory muscle use.  SKIN:  No visible rash  NEURO:  CN grossly intact.          Laboratory Data:   No results found for: \"PSA\"  Lab Results   Component Value Date    CR 1.3 11/29/2022    CR 1.26 07/05/2016    CR 1.09 01/04/2016    CR 1.06 01/07/2015    CR 1.03 11/25/2014    CR 1.05 11/18/2014     Lab Results   Component Value Date    GFRESTIMATED 55 11/29/2022    GFRESTIMATED 66 01/04/2016    GFRESTIMATED 72 01/07/2015    GFRESTIMATED 75 11/25/2014    GFRESTIMATED 73 11/18/2014    GFRESTIMATED 78 01/17/2012          Virtual Visit Details    Type of service:  Video Visit   Video Start Time: {video visit start/end time for provider to select:575581}  Video End Time:{video visit start/end time for provider to select:329299}    Originating Location (pt. Location): {video visit patient location:986537::\"Home\"}  {PROVIDER LOCATION On-site should be selected for visits conducted from your clinic location or adjoining [facility] hospital, academic office, or other nearby [facility] building. Off-site should be selected for all other provider locations, including home:065995}  Distant Location (provider location):  {virtual location provider:134983}  Platform used for Video Visit: {Virtual Visit Platforms:073201::\"[employer]\"}      Again, thank you for allowing me to participate in the care of your patient.  "     Sincerely,    LESLIE Freeman CNP

## 2024-10-07 ENCOUNTER — TELEPHONE (OUTPATIENT)
Dept: UROLOGY | Facility: CLINIC | Age: 84
End: 2024-10-07
Payer: COMMERCIAL

## 2024-10-18 ENCOUNTER — ANCILLARY PROCEDURE (OUTPATIENT)
Dept: CT IMAGING | Facility: CLINIC | Age: 84
End: 2024-10-18
Attending: NURSE PRACTITIONER
Payer: COMMERCIAL

## 2024-10-18 DIAGNOSIS — N28.1 KIDNEY CYSTS: ICD-10-CM

## 2024-10-18 DIAGNOSIS — Z87.442 HISTORY OF NEPHROLITHIASIS: ICD-10-CM

## 2024-10-18 LAB
CREAT BLD-MCNC: 1.4 MG/DL (ref 0.7–1.3)
EGFRCR SERPLBLD CKD-EPI 2021: 49 ML/MIN/1.73M2

## 2024-10-18 PROCEDURE — 250N000011 HC RX IP 250 OP 636: Performed by: NURSE PRACTITIONER

## 2024-10-18 PROCEDURE — 250N000009 HC RX 250: Performed by: NURSE PRACTITIONER

## 2024-10-18 PROCEDURE — 82565 ASSAY OF CREATININE: CPT

## 2024-10-18 PROCEDURE — 74178 CT ABD&PLV WO CNTR FLWD CNTR: CPT

## 2024-10-18 RX ORDER — IOPAMIDOL 755 MG/ML
100 INJECTION, SOLUTION INTRAVASCULAR ONCE
Status: COMPLETED | OUTPATIENT
Start: 2024-10-18 | End: 2024-10-18

## 2024-10-18 RX ADMIN — IOPAMIDOL 100 ML: 755 INJECTION, SOLUTION INTRAVENOUS at 14:32

## 2024-10-18 RX ADMIN — SODIUM CHLORIDE 120 ML: 9 INJECTION, SOLUTION INTRAVENOUS at 14:32

## 2024-11-02 ENCOUNTER — HEALTH MAINTENANCE LETTER (OUTPATIENT)
Age: 84
End: 2024-11-02

## (undated) DEVICE — PREP CHLORAPREP 26ML TINTED ORANGE  260815

## (undated) DEVICE — SOL WATER IRRIG 1000ML BOTTLE 2F7114

## (undated) DEVICE — ESU GROUND PAD UNIVERSAL W/O CORD

## (undated) DEVICE — PACK LAP CHOLE SLC15LCFSD

## (undated) DEVICE — SU VICRYL 0 UR-6 27" J603H

## (undated) DEVICE — SU PLAIN FAST ABSORB 6-0 PC-1 18" 1916G

## (undated) DEVICE — GLOVE PROTEXIS MICRO 7.0  2D73PM70

## (undated) DEVICE — ENDO KIT DISSECT BALL SYS FIRST ENTRY KII FIOS ADV FIX C0K17

## (undated) DEVICE — LINEN TOWEL PACK X5 5464

## (undated) DEVICE — PACK OCULOPLATIC SEN15OCFSD

## (undated) DEVICE — ESU EYE HIGH TEMP 65410-183

## (undated) DEVICE — BLADE CLIPPER 4406

## (undated) DEVICE — SU VICRYL 4-0 PS-2 18" UND J496H

## (undated) DEVICE — SOL NACL 0.9% IRRIG 1000ML BOTTLE 2F7124

## (undated) DEVICE — CATH TRAY FOLEY COUDE SURESTEP 16FR W/URNE MTR STLK A304716A

## (undated) DEVICE — SU VICRYL 4-0 P-3 18" UND  J494H

## (undated) DEVICE — GLOVE PROTEXIS POWDER FREE SMT 7.5  2D72PT75X

## (undated) DEVICE — SUCTION CANISTER MEDIVAC LINER 3000ML W/LID 65651-530

## (undated) DEVICE — GLOVE PROTEXIS W/NEU-THERA 8.0  2D73TE80

## (undated) DEVICE — GOWN LG DISP 9515

## (undated) DEVICE — NDL 19GA 1.5"

## (undated) DEVICE — SYSTEM CLEARIFY VISUALIZATION 21-345

## (undated) DEVICE — ENDO TROCAR BALLOON TIP 10MM  OMS-T10SB

## (undated) DEVICE — GOWN IMPERVIOUS SPECIALTY XLG/XLONG 32474

## (undated) RX ORDER — FENTANYL CITRATE 50 UG/ML
INJECTION, SOLUTION INTRAMUSCULAR; INTRAVENOUS
Status: DISPENSED
Start: 2019-11-13

## (undated) RX ORDER — LIDOCAINE HYDROCHLORIDE 10 MG/ML
INJECTION, SOLUTION EPIDURAL; INFILTRATION; INTRACAUDAL; PERINEURAL
Status: DISPENSED
Start: 2019-11-13

## (undated) RX ORDER — EPINEPHRINE 1 MG/ML
INJECTION, SOLUTION INTRAMUSCULAR; SUBCUTANEOUS
Status: DISPENSED
Start: 2019-11-13

## (undated) RX ORDER — BUPIVACAINE HYDROCHLORIDE 5 MG/ML
INJECTION, SOLUTION EPIDURAL; INTRACAUDAL
Status: DISPENSED
Start: 2019-11-13

## (undated) RX ORDER — FENTANYL CITRATE 50 UG/ML
INJECTION, SOLUTION INTRAMUSCULAR; INTRAVENOUS
Status: DISPENSED
Start: 2017-07-17

## (undated) RX ORDER — TETRACAINE HYDROCHLORIDE 5 MG/ML
SOLUTION OPHTHALMIC
Status: DISPENSED
Start: 2017-07-17

## (undated) RX ORDER — PROPOFOL 10 MG/ML
INJECTION, EMULSION INTRAVENOUS
Status: DISPENSED
Start: 2019-11-13

## (undated) RX ORDER — OXYCODONE HYDROCHLORIDE 5 MG/1
TABLET ORAL
Status: DISPENSED
Start: 2019-11-13